# Patient Record
Sex: MALE | Race: OTHER | HISPANIC OR LATINO | URBAN - METROPOLITAN AREA
[De-identification: names, ages, dates, MRNs, and addresses within clinical notes are randomized per-mention and may not be internally consistent; named-entity substitution may affect disease eponyms.]

---

## 2017-11-20 ENCOUNTER — INPATIENT (INPATIENT)
Facility: HOSPITAL | Age: 42
LOS: 1 days | Discharge: HOME | DRG: 305 | End: 2017-11-22
Attending: INTERNAL MEDICINE | Admitting: INTERNAL MEDICINE
Payer: COMMERCIAL

## 2017-11-20 VITALS
RESPIRATION RATE: 22 BRPM | HEART RATE: 98 BPM | HEIGHT: 66 IN | WEIGHT: 315 LBS | DIASTOLIC BLOOD PRESSURE: 125 MMHG | OXYGEN SATURATION: 90 % | TEMPERATURE: 99 F | SYSTOLIC BLOOD PRESSURE: 245 MMHG

## 2017-11-20 DIAGNOSIS — D72.829 ELEVATED WHITE BLOOD CELL COUNT, UNSPECIFIED: ICD-10-CM

## 2017-11-20 DIAGNOSIS — I50.9 HEART FAILURE, UNSPECIFIED: ICD-10-CM

## 2017-11-20 DIAGNOSIS — Z29.9 ENCOUNTER FOR PROPHYLACTIC MEASURES, UNSPECIFIED: ICD-10-CM

## 2017-11-20 DIAGNOSIS — I16.0 HYPERTENSIVE URGENCY: ICD-10-CM

## 2017-11-20 DIAGNOSIS — E11.9 TYPE 2 DIABETES MELLITUS WITHOUT COMPLICATIONS: ICD-10-CM

## 2017-11-20 PROBLEM — Z00.00 ENCOUNTER FOR PREVENTIVE HEALTH EXAMINATION: Status: ACTIVE | Noted: 2017-11-20

## 2017-11-20 LAB
ALBUMIN SERPL ELPH-MCNC: 3.1 G/DL — LOW (ref 3.5–5)
ALP SERPL-CCNC: 100 U/L — SIGNIFICANT CHANGE UP (ref 40–120)
ALT FLD-CCNC: 44 U/L DA — SIGNIFICANT CHANGE UP (ref 10–60)
ANION GAP SERPL CALC-SCNC: 8 MMOL/L — SIGNIFICANT CHANGE UP (ref 5–17)
APTT BLD: 30.4 SEC — SIGNIFICANT CHANGE UP (ref 27.5–37.4)
AST SERPL-CCNC: 20 U/L — SIGNIFICANT CHANGE UP (ref 10–40)
BASOPHILS # BLD AUTO: 0.1 K/UL — SIGNIFICANT CHANGE UP (ref 0–0.2)
BASOPHILS NFR BLD AUTO: 0.9 % — SIGNIFICANT CHANGE UP (ref 0–2)
BILIRUB SERPL-MCNC: 1.3 MG/DL — HIGH (ref 0.2–1.2)
BUN SERPL-MCNC: 14 MG/DL — SIGNIFICANT CHANGE UP (ref 7–18)
CALCIUM SERPL-MCNC: 9.1 MG/DL — SIGNIFICANT CHANGE UP (ref 8.4–10.5)
CHLORIDE SERPL-SCNC: 100 MMOL/L — SIGNIFICANT CHANGE UP (ref 96–108)
CK MB BLD-MCNC: 1.4 % — SIGNIFICANT CHANGE UP (ref 0–3.5)
CK MB CFR SERPL CALC: 2.8 NG/ML — SIGNIFICANT CHANGE UP (ref 0–3.6)
CK SERPL-CCNC: 195 U/L — SIGNIFICANT CHANGE UP (ref 35–232)
CO2 SERPL-SCNC: 32 MMOL/L — HIGH (ref 22–31)
CREAT SERPL-MCNC: 1.11 MG/DL — SIGNIFICANT CHANGE UP (ref 0.5–1.3)
EOSINOPHIL # BLD AUTO: 0.2 K/UL — SIGNIFICANT CHANGE UP (ref 0–0.5)
EOSINOPHIL NFR BLD AUTO: 1.5 % — SIGNIFICANT CHANGE UP (ref 0–6)
GLUCOSE SERPL-MCNC: 118 MG/DL — HIGH (ref 70–99)
HCT VFR BLD CALC: 55.3 % — HIGH (ref 39–50)
HGB BLD-MCNC: 16.7 G/DL — SIGNIFICANT CHANGE UP (ref 13–17)
INR BLD: 1.19 RATIO — HIGH (ref 0.88–1.16)
LYMPHOCYTES # BLD AUTO: 1.3 K/UL — SIGNIFICANT CHANGE UP (ref 1–3.3)
LYMPHOCYTES # BLD AUTO: 9.8 % — LOW (ref 13–44)
MCHC RBC-ENTMCNC: 26.2 PG — LOW (ref 27–34)
MCHC RBC-ENTMCNC: 30.2 GM/DL — LOW (ref 32–36)
MCV RBC AUTO: 87 FL — SIGNIFICANT CHANGE UP (ref 80–100)
MONOCYTES # BLD AUTO: 0.6 K/UL — SIGNIFICANT CHANGE UP (ref 0–0.9)
MONOCYTES NFR BLD AUTO: 4.4 % — SIGNIFICANT CHANGE UP (ref 2–14)
NEUTROPHILS # BLD AUTO: 11 K/UL — HIGH (ref 1.8–7.4)
NEUTROPHILS NFR BLD AUTO: 83.4 % — HIGH (ref 43–77)
NT-PROBNP SERPL-SCNC: 1137 PG/ML — HIGH (ref 0–125)
PLATELET # BLD AUTO: 353 K/UL — SIGNIFICANT CHANGE UP (ref 150–400)
POTASSIUM SERPL-MCNC: 3.7 MMOL/L — SIGNIFICANT CHANGE UP (ref 3.5–5.3)
POTASSIUM SERPL-SCNC: 3.7 MMOL/L — SIGNIFICANT CHANGE UP (ref 3.5–5.3)
PROT SERPL-MCNC: 7.3 G/DL — SIGNIFICANT CHANGE UP (ref 6–8.3)
PROTHROM AB SERPL-ACNC: 13 SEC — HIGH (ref 9.8–12.7)
RBC # BLD: 6.35 M/UL — HIGH (ref 4.2–5.8)
RBC # FLD: 17.5 % — HIGH (ref 10.3–14.5)
SODIUM SERPL-SCNC: 140 MMOL/L — SIGNIFICANT CHANGE UP (ref 135–145)
TROPONIN I SERPL-MCNC: 0.12 NG/ML — HIGH (ref 0–0.04)
TROPONIN I SERPL-MCNC: 0.14 NG/ML — HIGH (ref 0–0.04)
WBC # BLD: 13.2 K/UL — HIGH (ref 3.8–10.5)
WBC # FLD AUTO: 13.2 K/UL — HIGH (ref 3.8–10.5)

## 2017-11-20 PROCEDURE — 93010 ELECTROCARDIOGRAM REPORT: CPT

## 2017-11-20 PROCEDURE — 71020: CPT | Mod: 26

## 2017-11-20 PROCEDURE — 93971 EXTREMITY STUDY: CPT | Mod: 26,RT

## 2017-11-20 PROCEDURE — 99285 EMERGENCY DEPT VISIT HI MDM: CPT

## 2017-11-20 PROCEDURE — 93306 TTE W/DOPPLER COMPLETE: CPT | Mod: 26

## 2017-11-20 RX ORDER — FUROSEMIDE 40 MG
80 TABLET ORAL EVERY 12 HOURS
Qty: 0 | Refills: 0 | Status: DISCONTINUED | OUTPATIENT
Start: 2017-11-20 | End: 2017-11-22

## 2017-11-20 RX ORDER — FUROSEMIDE 40 MG
40 TABLET ORAL ONCE
Qty: 0 | Refills: 0 | Status: COMPLETED | OUTPATIENT
Start: 2017-11-20 | End: 2017-11-20

## 2017-11-20 RX ORDER — INFLUENZA VIRUS VACCINE 15; 15; 15; 15 UG/.5ML; UG/.5ML; UG/.5ML; UG/.5ML
0.5 SUSPENSION INTRAMUSCULAR ONCE
Qty: 0 | Refills: 0 | Status: COMPLETED | OUTPATIENT
Start: 2017-11-20 | End: 2017-11-22

## 2017-11-20 RX ORDER — SODIUM CHLORIDE 9 MG/ML
3 INJECTION INTRAMUSCULAR; INTRAVENOUS; SUBCUTANEOUS ONCE
Qty: 0 | Refills: 0 | Status: COMPLETED | OUTPATIENT
Start: 2017-11-20 | End: 2017-11-20

## 2017-11-20 RX ORDER — ASPIRIN/CALCIUM CARB/MAGNESIUM 324 MG
81 TABLET ORAL DAILY
Qty: 0 | Refills: 0 | Status: DISCONTINUED | OUTPATIENT
Start: 2017-11-21 | End: 2017-11-22

## 2017-11-20 RX ORDER — LABETALOL HCL 100 MG
10 TABLET ORAL ONCE
Qty: 0 | Refills: 0 | Status: COMPLETED | OUTPATIENT
Start: 2017-11-20 | End: 2017-11-20

## 2017-11-20 RX ORDER — ASPIRIN/CALCIUM CARB/MAGNESIUM 324 MG
325 TABLET ORAL ONCE
Qty: 0 | Refills: 0 | Status: COMPLETED | OUTPATIENT
Start: 2017-11-20 | End: 2017-11-20

## 2017-11-20 RX ADMIN — Medication 10 MILLIGRAM(S): at 20:11

## 2017-11-20 RX ADMIN — Medication 10 MILLIGRAM(S): at 13:39

## 2017-11-20 RX ADMIN — Medication 40 MILLIGRAM(S): at 13:35

## 2017-11-20 RX ADMIN — Medication 80 MILLIGRAM(S): at 23:30

## 2017-11-20 RX ADMIN — Medication 325 MILLIGRAM(S): at 15:02

## 2017-11-20 RX ADMIN — SODIUM CHLORIDE 3 MILLILITER(S): 9 INJECTION INTRAMUSCULAR; INTRAVENOUS; SUBCUTANEOUS at 13:33

## 2017-11-20 NOTE — ED PROVIDER NOTE - OBJECTIVE STATEMENT
43 y/o male with PMHx of DM presents to the ED c/o R leg swelling and SOB x4 days. Pt denies CP, nausea, vomiting, diaphoresis, back pain, or any other complaints. NKDA.

## 2017-11-20 NOTE — H&P ADULT - ASSESSMENT
43 y/o male with PMH DM presents to the ED c/o R leg swelling and SOB x 5 days. Pt states that it gets worst during exertion. Pt sleeps with 4 pillows and sometimes wakes up with SOB. As per pt he has no hx of HTN. Pt  describes that his lower extremity edema is getting worst and  that his RLE pain is getting worst. Pt has a BNP of 1137 with positive t1 0.142. Pt denies CP, nausea, vomiting, diaphoresis, back pain, or any other complaints

## 2017-11-20 NOTE — H&P ADULT - PROBLEM SELECTOR PLAN 2
Bp: 245/125   s/p labetalol   not decrease more 25% in the first hour  then maintain average BP of 150/100 for 6 hours  if needed pt can be started on procardia Bp: 245/125   s/p labetalol   not decrease more 25% in the first hour  increase troponin most likely to demand ischemia and HTN  then maintain average BP of 150/100 for 6 hours  will start ace inhibitor since pt is diabetic

## 2017-11-20 NOTE — H&P ADULT - PROBLEM SELECTOR PLAN 1
41 yo M with hx DM presents with SOB and bilateral LE edema   family hx of cardiac disease   pt complaint of orthopnea and uses 3 pillows at home   pt also at times has nocturnal paroxysmal dyspnea   BNP: 1132  EKG NSR   LE duplex negatvie for DVT, PE less likely   could be 2/2 to new onset HF   will get echo   Cardio: 41 yo M with hx DM presents with SOB and bilateral LE edema   family hx of cardiac disease   pt complaint of orthopnea and uses 3 pillows at home   pt also at times has nocturnal paroxysmal dyspnea   CXR shows cardiomegaly   BNP: 1132  EKG NSR   LE duplex negatvie for DVT, PE less likely   could be 2/2 to new onset HF   will get echo   Cardio:

## 2017-11-20 NOTE — ED PROVIDER NOTE - FAMILY HISTORY
Mother  Still living? Unknown  Family history of colon cancer, Age at diagnosis: Age Unknown  Family history of diabetes mellitus, Age at diagnosis: Age Unknown  Family history of hypertension in mother, Age at diagnosis: Age Unknown     Sibling  Still living? Unknown  Family history of early CAD, Age at diagnosis: Age Unknown

## 2017-11-20 NOTE — H&P ADULT - HISTORY OF PRESENT ILLNESS
43 y/o male with PMH DM presents to the ED c/o R leg swelling and SOB x 5 days. Pt states that it gets worst during exertion. Pt sleeps with 4 pillows and sometimes wakes up with SOB. As per pt he has no hx of HTN. Pt  describes that his lower extremity edema is getting worst and  that his RLE pain is getting worst. Pt denies CP, nausea, vomiting, diaphoresis, back pain, or any other complaints

## 2017-11-21 DIAGNOSIS — I16.1 HYPERTENSIVE EMERGENCY: ICD-10-CM

## 2017-11-21 LAB
CHOLEST SERPL-MCNC: 135 MG/DL — SIGNIFICANT CHANGE UP (ref 10–199)
FOLATE SERPL-MCNC: 9.2 NG/ML — SIGNIFICANT CHANGE UP (ref 4.8–24.2)
GLUCOSE BLDC GLUCOMTR-MCNC: 103 MG/DL — HIGH (ref 70–99)
GLUCOSE BLDC GLUCOMTR-MCNC: 112 MG/DL — HIGH (ref 70–99)
GLUCOSE BLDC GLUCOMTR-MCNC: 127 MG/DL — HIGH (ref 70–99)
GLUCOSE BLDC GLUCOMTR-MCNC: 151 MG/DL — HIGH (ref 70–99)
HBA1C BLD-MCNC: 7.2 % — HIGH (ref 4–5.6)
HDLC SERPL-MCNC: 40 MG/DL — SIGNIFICANT CHANGE UP (ref 40–125)
LIPID PNL WITH DIRECT LDL SERPL: 73 MG/DL — SIGNIFICANT CHANGE UP
MAGNESIUM SERPL-MCNC: 1.9 MG/DL — SIGNIFICANT CHANGE UP (ref 1.6–2.6)
PCP SPEC-MCNC: SIGNIFICANT CHANGE UP
PHOSPHATE SERPL-MCNC: 3.9 MG/DL — SIGNIFICANT CHANGE UP (ref 2.5–4.5)
TOTAL CHOLESTEROL/HDL RATIO MEASUREMENT: 3.4 RATIO — SIGNIFICANT CHANGE UP (ref 3.4–9.6)
TRIGL SERPL-MCNC: 112 MG/DL — SIGNIFICANT CHANGE UP (ref 10–149)
TSH SERPL-MCNC: 3.06 UU/ML — SIGNIFICANT CHANGE UP (ref 0.34–4.82)
VIT B12 SERPL-MCNC: 376 PG/ML — SIGNIFICANT CHANGE UP (ref 243–894)

## 2017-11-21 RX ORDER — ATORVASTATIN CALCIUM 80 MG/1
40 TABLET, FILM COATED ORAL AT BEDTIME
Qty: 0 | Refills: 0 | Status: DISCONTINUED | OUTPATIENT
Start: 2017-11-21 | End: 2017-11-22

## 2017-11-21 RX ORDER — CARVEDILOL PHOSPHATE 80 MG/1
6.25 CAPSULE, EXTENDED RELEASE ORAL EVERY 12 HOURS
Qty: 0 | Refills: 0 | Status: DISCONTINUED | OUTPATIENT
Start: 2017-11-21 | End: 2017-11-22

## 2017-11-21 RX ADMIN — Medication 10 MILLIGRAM(S): at 06:09

## 2017-11-21 RX ADMIN — Medication 80 MILLIGRAM(S): at 17:43

## 2017-11-21 RX ADMIN — Medication 80 MILLIGRAM(S): at 06:09

## 2017-11-21 RX ADMIN — CARVEDILOL PHOSPHATE 6.25 MILLIGRAM(S): 80 CAPSULE, EXTENDED RELEASE ORAL at 17:43

## 2017-11-21 RX ADMIN — ATORVASTATIN CALCIUM 40 MILLIGRAM(S): 80 TABLET, FILM COATED ORAL at 21:53

## 2017-11-21 RX ADMIN — Medication 81 MILLIGRAM(S): at 12:18

## 2017-11-21 NOTE — CONSULT NOTE ADULT - SUBJECTIVE AND OBJECTIVE BOX
Patient is a 42y old  Male who presents with a chief complaint of SOB and RLE edema (20 Nov 2017 15:33)      INTERVAL HPI/OVERNIGHT EVENTS:  T(F): 98.5 (11-21-17 @ 05:47), Max: 99.2 (11-20-17 @ 12:15)  HR: 85 (11-21-17 @ 05:47) (64 - 98)  BP: 156/91 (11-21-17 @ 05:47) (152/103 - 245/125)  RR: 18 (11-21-17 @ 05:47) (16 - 22)  SpO2: 98% (11-21-17 @ 05:47) (90% - 98%)  Wt(kg): --  I&O's Summary      PAST MEDICAL & SURGICAL HISTORY:  DM (diabetes mellitus): type 2  No significant past surgical history      SOCIAL HISTORY  Alcohol:  Tobacco:  Illicit substance use:      FAMILY HISTORY:      LABS:                        16.7   13.2  )-----------( 353      ( 20 Nov 2017 13:39 )             55.3     11-20    140  |  100  |  14  ----------------------------<  118<H>  3.7   |  32<H>  |  1.11    Ca    9.1      20 Nov 2017 13:39  Phos  3.9     11-21  Mg     1.9     11-21    TPro  7.3  /  Alb  3.1<L>  /  TBili  1.3<H>  /  DBili  x   /  AST  20  /  ALT  44  /  AlkPhos  100  11-20    PT/INR - ( 20 Nov 2017 13:39 )   PT: 13.0 sec;   INR: 1.19 ratio         PTT - ( 20 Nov 2017 13:39 )  PTT:30.4 sec    CAPILLARY BLOOD GLUCOSE      POCT Blood Glucose.: 127 mg/dL (21 Nov 2017 08:03)            MEDICATIONS  (STANDING):  aspirin  chewable 81 milliGRAM(s) Oral daily  carvedilol 6.25 milliGRAM(s) Oral every 12 hours  enalapril 10 milliGRAM(s) Oral daily  furosemide   Injectable 80 milliGRAM(s) IV Push every 12 hours  influenza   Vaccine 0.5 milliLiter(s) IntraMuscular once    MEDICATIONS  (PRN):      REVIEW OF SYSTEMS:  CONSTITUTIONAL: No fever, weight loss, or fatigue  EYES: No eye pain, visual disturbances, or discharge  ENMT:  No difficulty hearing, tinnitus, vertigo; No sinus or throat pain  NECK: No pain or stiffness  RESPIRATORY:  shortness of breath, No cough, wheezing, chills or hemoptysis;   CARDIOVASCULAR: No chest pain, palpitations, dizziness,  leg swelling present  GASTROINTESTINAL: No abdominal or epigastric pain. No nausea, vomiting, or hematemesis; No diarrhea or constipation. No melena or hematochezia.  GENITOURINARY: No dysuria, frequency, hematuria, or incontinence  NEUROLOGICAL: No headaches, memory loss, loss of strength, numbness, or tremors  SKIN: No itching, burning, rashes, or lesions   LYMPH NODES: No enlarged glands  ENDOCRINE: No heat or cold intolerance; No hair loss  MUSCULOSKELETAL: No joint pain or swelling; No muscle, back, or extremity pain  PSYCHIATRIC: No depression, anxiety, mood swings, or difficulty sleeping  HEME/LYMPH: No easy bruising, or bleeding gums  ALLERGY AND IMMUNOLOGIC: No hives or eczema    RADIOLOGY & ADDITIONAL TESTS:    Imaging Personally Reviewed:  [ x] YES  [ ] NO    Consultant(s) Notes Reviewed:  [x ] YES  [ ] NO    PHYSICAL EXAM:  GENERAL: NAD, well-groomed, well-developed  HEAD:  Atraumatic, Normocephalic  EYES: EOMI, PERRLA, conjunctiva and sclera clear  ENMT: No tonsillar erythema, exudates, or enlargement; Moist mucous membranes, Good dentition, No lesions  NECK: Supple, No JVD, Normal thyroid  NERVOUS SYSTEM:  Alert & Oriented X3, Good concentration; Motor Strength 5/5 B/L upper and lower extremities; DTRs 2+ intact and symmetric  CHEST/LUNG: Clear to percussion bilaterally; No rales, rhonchi, wheezing, or rubs  HEART: Regular rate and rhythm; No murmurs, rubs, or gallops  ABDOMEN: Soft, Nontender, Nondistended; Bowel sounds present  EXTREMITIES:  2+ pedal edema, 2+ Peripheral Pulses, No clubbing, cyanosis.  LYMPH: No lymphadenopathy noted  SKIN: No rashes or lesions    Care Discussed with Consultants/Other Providers [x ] YES  [ ] NO

## 2017-11-21 NOTE — PROGRESS NOTE ADULT - PROBLEM SELECTOR PLAN 2
Bp: 245/125   s/p labetalol   increase troponin most likely to demand ischemia and HTN  bp 150/90s, start on coreg 6.25 BID, on IV lasix 40 BID for today, will switch to PO  Also on enalapril 10 mg daily.  Creatinine will likely get worse in am due to Drop in blood pressure.

## 2017-11-21 NOTE — PROGRESS NOTE ADULT - SUBJECTIVE AND OBJECTIVE BOX
PGY 1 Note discussed with supervising resident and primary attending    Patient is a 42y old  Male who presents with a chief complaint of SOB and RLE edema (20 Nov 2017 15:33)      INTERVAL HPI/OVERNIGHT EVENTS:  Patient was seen and examined at bed side. patient states that his shortness of breath is resolved. patient's swelling is still present. patient's lungs were clear to auscultation. patient wanted to leave today but explained that intravenous lasix is necessary for him and will switch to PO lasix tomorrow.     MEDICATIONS  (STANDING):  aspirin  chewable 81 milliGRAM(s) Oral daily  carvedilol 6.25 milliGRAM(s) Oral every 12 hours  enalapril 10 milliGRAM(s) Oral daily  furosemide   Injectable 80 milliGRAM(s) IV Push every 12 hours  influenza   Vaccine 0.5 milliLiter(s) IntraMuscular once    MEDICATIONS  (PRN):      __________________________________________________  REVIEW OF SYSTEMS:    CONSTITUTIONAL: No fever,   EYES: no acute visual disturbances  NECK: No pain or stiffness  RESPIRATORY: No cough; No shortness of breath at this point.   CARDIOVASCULAR: No chest pain, no palpitations  GASTROINTESTINAL: No pain. No nausea or vomiting; No diarrhea   NEUROLOGICAL: No headache or numbness, no tremors  MUSCULOSKELETAL: No joint pain, no muscle pain  GENITOURINARY: no dysuria, no frequency, no hesitancy  PSYCHIATRY: no depression , no anxiety  ALL OTHER  ROS negative        Vital Signs Last 24 Hrs  T(C): 36.9 (21 Nov 2017 05:47), Max: 37.2 (20 Nov 2017 16:03)  T(F): 98.5 (21 Nov 2017 05:47), Max: 98.9 (20 Nov 2017 16:03)  HR: 85 (21 Nov 2017 05:47) (64 - 96)  BP: 156/91 (21 Nov 2017 05:47) (152/103 - 194/114)  BP(mean): --  RR: 18 (21 Nov 2017 05:47) (16 - 20)  SpO2: 98% (21 Nov 2017 05:47) (90% - 98%)    ________________________________________________  PHYSICAL EXAM:  GENERAL: NAD  HEENT: Normocephalic;  conjunctivae and sclerae clear; moist mucous membranes;   NECK : supple  CHEST/LUNG: Clear to auscultation bilaterally with good air entry   HEART: S1 S2  regular; no murmurs, gallops or rubs  ABDOMEN: Soft, Nontender, Nondistended; Bowel sounds present  EXTREMITIES: no cyanosis; 2+ pitting edema; no calf tenderness  SKIN: warm and dry; no rash  NERVOUS SYSTEM:  Awake and alert; Oriented  to place, person and time ; no new deficits    _________________________________________________  LABS:                        16.7   13.2  )-----------( 353      ( 20 Nov 2017 13:39 )             55.3     11-20    140  |  100  |  14  ----------------------------<  118<H>  3.7   |  32<H>  |  1.11    Ca    9.1      20 Nov 2017 13:39  Phos  3.9     11-21  Mg     1.9     11-21    TPro  7.3  /  Alb  3.1<L>  /  TBili  1.3<H>  /  DBili  x   /  AST  20  /  ALT  44  /  AlkPhos  100  11-20    PT/INR - ( 20 Nov 2017 13:39 )   PT: 13.0 sec;   INR: 1.19 ratio         PTT - ( 20 Nov 2017 13:39 )  PTT:30.4 sec    CAPILLARY BLOOD GLUCOSE      POCT Blood Glucose.: 112 mg/dL (21 Nov 2017 12:02)  POCT Blood Glucose.: 127 mg/dL (21 Nov 2017 08:03)        RADIOLOGY & ADDITIONAL TESTS:      < from: Transthoracic Echocardiogram (11.20.17 @ 16:10) >  CONCLUSIONS:  1. Normal mitral valve. Trace mitral regurgitation.  2. Probably trileaflet aortic valve is not well seen. No  aortic stenosis. No aortic valve regurgitation seen.  3. Normal aortic root for patient's BSA (Index 1.1).  4. Mildly dilated left atrium.  LA volume index = 36 cc/m2.  5. Severe concentric left ventricular hypertrophy.  6. Endocardium not well visualized; grossly normal left  ventricular function.  7. Grade II diastolic dysfunction.  8. Right ventricular enlargement with normal RV function  (TAPSE 2.2 cm).  9. RA Pressure is 15 mm Hg.  10. RV systolic pressure is moderately increased at  51 mm  Hg.  11. Normal tricuspid valve. Trace tricuspid regurgitation.  12. Pulmonic valve not well seen. Trace pulmonic  insufficiency is noted.  13. No pericardial effusion.          Imaging Personally Reviewed:  YES    Consultant(s) Notes Reviewed:   YES  Care Discussed with Consultants :     Plan of care was discussed with patient and /or primary care giver; all questions and concerns were addressed and care was aligned with patient's wishes.

## 2017-11-21 NOTE — PROGRESS NOTE ADULT - PROBLEM SELECTOR PLAN 1
43 yo M with hx DM presents with SOB and bilateral LE edema   family hx of cardiac disease   pt complaint of orthopnea and uses 3 pillows at home   morbidly obese, will need out patient sleep study  echo as above.   pt also at times has nocturnal paroxysmal dyspnea   CXR shows cardiomegaly   BNP: 1132  EKG NSR   LE duplex negatvie for DVT, PE less likely   2/2 to new onset congestive heart failure   Cardio: Dr Martell consult noted

## 2017-11-21 NOTE — CONSULT NOTE ADULT - SUBJECTIVE AND OBJECTIVE BOX
42 year old obese male with history of DM on metformin presented for shortness on breath on exacerbation and need 4 pillows to sleep at night and right leg swelling. In ED patient BP is found to be 245/125. Patient is never known to have HTN, and does not follow up regularly with PCP. Denied headache, palpitation and chest pain. Patient admit snoring at night and feeling tired in daytime for the last 2 weeks.       PAST MEDICAL & SURGICAL HISTORY:  DM (diabetes mellitus): type 2  No significant past surgical history      No Known Allergies      Meds:  aspirin  chewable 81 milliGRAM(s) Oral daily  atorvastatin 40 milliGRAM(s) Oral at bedtime  carvedilol 6.25 milliGRAM(s) Oral every 12 hours  enalapril 10 milliGRAM(s) Oral daily  furosemide   Injectable 80 milliGRAM(s) IV Push every 12 hours  influenza   Vaccine 0.5 milliLiter(s) IntraMuscular once      SOCIAL HISTORY:  Smoker:  YES / NO        PACK YEARS:                         WHEN QUIT?  ETOH use:  YES / NO               FREQUENCY / QUANTITY:  Ilicit Drug use:  YES / NO  Occupation:  Assisted device use (Cane / Walker):  Live with:    FAMILY HISTORY:  Family history of hypertension in mother  Family history of diabetes mellitus  Family history of early CAD (Sibling)  Family history of colon cancer    REVIEW OF SYSTEMS:    CONSTITUTIONAL: No weakness, fevers or chills  EYES/ENT: No visual changes;  No vertigo or throat pain   NECK: No pain or stiffness  RESPIRATORY: No cough, wheezing, hemoptysis; SOB as mentioned above  CARDIOVASCULAR: No chest pain or palpitations  GASTROINTESTINAL: No abdominal or epigastric pain. No nausea, vomiting, or hematemesis; No diarrhea or constipation. No melena or hematochezia.  GENITOURINARY: No dysuria, frequency or hematuria  NEUROLOGICAL: No numbness or weakness  SKIN: No itching, rashes  No other complaint except mentioned as above.       VITALS:  Vital Signs Last 24 Hrs  T(C): 36.9 (21 Nov 2017 05:47), Max: 37.2 (20 Nov 2017 16:03)  T(F): 98.5 (21 Nov 2017 05:47), Max: 98.9 (20 Nov 2017 16:03)  HR: 85 (21 Nov 2017 05:47) (64 - 96)  BP: 156/91 (21 Nov 2017 05:47) (152/103 - 194/114)  BP(mean): --  RR: 18 (21 Nov 2017 05:47) (16 - 20)  SpO2: 98% (21 Nov 2017 05:47) (90% - 98%)    PHYSICAL EXAM:    GENERAL: NAD, well-developed    HEAD:  Atraumatic, Normocephalic    EYES: EOMI, PERRLA, conjunctiva and sclera clear    NECK: Supple, No JVD    CHEST/LUNG: Clear to auscultation bilaterally; No wheeze    HEART: Regular rate and rhythm; No murmurs, rubs, or gallops    ABDOMEN: Soft, Nontender, Nondistended; Bowel sounds present    EXTREMITIES:  2+ Peripheral Pulses, No clubbing, cyanosis, 3+edema on right lower leg and 2+ edema on left lower leg.   and skin discoloration on bilateral lower extremities (likely from uncontrolled DM)    PSYCH: AAOx3    NEUROLOGY: non-focal    SKIN: No rashes or lesions    No other pertinent positive finding except mentioned as above.         LABS/DIAGNOSTIC TESTS:                          16.7   13.2  )-----------( 353      ( 20 Nov 2017 13:39 )             55.3     WBC Count: 13.2 K/uL (11-20 @ 13:39)      11-20    140  |  100  |  14  ----------------------------<  118<H>  3.7   |  32<H>  |  1.11    Ca    9.1      20 Nov 2017 13:39  Phos  3.9     11-21  Mg     1.9     11-21    TPro  7.3  /  Alb  3.1<L>  /  TBili  1.3<H>  /  DBili  x   /  AST  20  /  ALT  44  /  AlkPhos  100  11-20          LIVER FUNCTIONS - ( 20 Nov 2017 13:39 )  Alb: 3.1 g/dL / Pro: 7.3 g/dL / ALK PHOS: 100 U/L / ALT: 44 U/L DA / AST: 20 U/L / GGT: x             PT/INR - ( 20 Nov 2017 13:39 )   PT: 13.0 sec;   INR: 1.19 ratio         PTT - ( 20 Nov 2017 13:39 )  PTT:30.4 sec ANGUS SAENZ. NEPHROLOGY- CONSULT    42 year old obese male with history of DM on metformin presented for shortness on breath on exacerbation and need 4 pillows to sleep at night and right leg swelling. In ED patient BP is found to be 245/125. Patient is never known to have HTN, and does not follow up regularly with PCP. Denied headache, palpitation, vision changes or chest pain. Patient admit snoring at night and feeling tired in daytime for the last 2 weeks.   Pt denies any episodes of HA with diaphoresis   Pt denies any drug use.       PAST MEDICAL & SURGICAL HISTORY:  DM (diabetes mellitus): type 2  No significant past surgical history      No Known Allergies      Meds:  aspirin  chewable 81 milliGRAM(s) Oral daily  atorvastatin 40 milliGRAM(s) Oral at bedtime  carvedilol 6.25 milliGRAM(s) Oral every 12 hours  enalapril 10 milliGRAM(s) Oral daily  furosemide   Injectable 80 milliGRAM(s) IV Push every 12 hours  influenza   Vaccine 0.5 milliLiter(s) IntraMuscular once      SOCIAL HISTORY:  Smoker:  YES / NO        PACK YEARS:                         WHEN QUIT?  ETOH use:  YES / NO               FREQUENCY / QUANTITY:  Ilicit Drug use:  YES / NO  Occupation:   Assisted device use (Cane / Walker):  Live with:    FAMILY HISTORY:  Family history of hypertension in mother  Family history of diabetes mellitus  Family history of early CAD (Sibling)  Family history of colon cancer    REVIEW OF SYSTEMS:    CONSTITUTIONAL: No weakness, fevers or chills  EYES/ENT: No visual changes;  No vertigo or throat pain   NECK: No pain or stiffness  RESPIRATORY: No cough, wheezing, hemoptysis; SOB as mentioned above  CARDIOVASCULAR: No chest pain or palpitations  GASTROINTESTINAL: No abdominal or epigastric pain. No nausea, vomiting, or hematemesis; No diarrhea or constipation. No melena or hematochezia.  GENITOURINARY: No dysuria, frequency or hematuria  NEUROLOGICAL: No numbness or weakness  SKIN: No itching, rashes  EXT: +b/l LE Edema  No other complaint except mentioned as above.       VITALS:  Vital Signs Last 24 Hrs  T(C): 36.9 (21 Nov 2017 05:47), Max: 37.2 (20 Nov 2017 16:03)  T(F): 98.5 (21 Nov 2017 05:47), Max: 98.9 (20 Nov 2017 16:03)  HR: 85 (21 Nov 2017 05:47) (64 - 96)  BP: 156/91 (21 Nov 2017 05:47) (152/103 - 194/114)  BP(mean): --  RR: 18 (21 Nov 2017 05:47) (16 - 20)  SpO2: 98% (21 Nov 2017 05:47) (90% - 98%)    PHYSICAL EXAM:    GENERAL: NAD, well-developed    HEAD:  Atraumatic, Normocephalic    EYES: EOMI, PERRLA, conjunctiva and sclera clear    NECK: Supple, No JVD    CHEST/LUNG: Clear to auscultation bilaterally; No wheeze    HEART: Regular rate and rhythm; No murmurs, rubs, or gallops    ABDOMEN: Soft, Nontender, Nondistended; Bowel sounds present    EXTREMITIES:  2+ Peripheral Pulses, No clubbing, cyanosis, 3+edema on right lower leg and 2+ edema on left lower leg.   and skin discoloration on bilateral lower extremities (likely from uncontrolled DM)    PSYCH: AAOx3    NEUROLOGY: non-focal    SKIN: No rashes or lesions    No other pertinent positive finding except mentioned as above.         LABS/DIAGNOSTIC TESTS:                          16.7   13.2  )-----------( 353      ( 20 Nov 2017 13:39 )             55.3     WBC Count: 13.2 K/uL (11-20 @ 13:39)      11-20    140  |  100  |  14  ----------------------------<  118<H>  3.7   |  32<H>  |  1.11    Ca    9.1      20 Nov 2017 13:39  Phos  3.9     11-21  Mg     1.9     11-21    TPro  7.3  /  Alb  3.1<L>  /  TBili  1.3<H>  /  DBili  x   /  AST  20  /  ALT  44  /  AlkPhos  100  11-20          LIVER FUNCTIONS - ( 20 Nov 2017 13:39 )  Alb: 3.1 g/dL / Pro: 7.3 g/dL / ALK PHOS: 100 U/L / ALT: 44 U/L DA / AST: 20 U/L / GGT: x             PT/INR - ( 20 Nov 2017 13:39 )   PT: 13.0 sec;   INR: 1.19 ratio         PTT - ( 20 Nov 2017 13:39 )  PTT:30.4 sec

## 2017-11-21 NOTE — CONSULT NOTE ADULT - ASSESSMENT
41 y/o male with PMH DM presents to the ED c/o R leg swelling and SOB x 5 days. Admitted for ?CHF and HTN urgency. Endocrinology was consulted for Diabetes.

## 2017-11-21 NOTE — CONSULT NOTE ADULT - PROBLEM SELECTOR RECOMMENDATION 3
-sugar well controlled inpatient  -f/u HbA1C as suspected uncontrolled blood sugar with evident of diabetes skin changes

## 2017-11-21 NOTE — CONSULT NOTE ADULT - PROBLEM SELECTOR RECOMMENDATION 9
home med: metformin 500mg daily  HbA1C 7.2  home FS has been as low as 70 and high as 110  diabetic diet  monitor FS ac hs home med: metformin 500mg daily  HbA1C 7.2, at near goal  home FS has been as low as 70 and high as 110  diabetic diet  monitor FS ac hs  recommend to increase metformin to 500mg BID and to consider starting victoza as outpatient home med: metformin 500mg daily  HbA1C 7.2, at near goal  home FS has been as low as 70 and high as 110  diabetic diet  monitor FS ac hs  recommend to increase metformin to 500mg BID and to consider starting victoza as outpatient  d/w hs

## 2017-11-21 NOTE — PROGRESS NOTE ADULT - PROBLEM SELECTOR PLAN 4
pt on Metformin 100 BID  f/u A1c 7.0  Endo consult Dr Estes appreciated  started on atorvastatin 40 mg daily

## 2017-11-21 NOTE — CONSULT NOTE ADULT - SUBJECTIVE AND OBJECTIVE BOX
Patient is a 42y old  Male who presents with a chief complaint of SOB and RLE edema (20 Nov 2017 15:33)    HPI:  41 y/o male with PMH DM presents to the ED c/o R leg swelling and SOB x 5 days. Admitted for ?CHF and HTN urgency. Endocrinology was consulted for Diabetes. Patient was seen and examined, reports that he has 1 year Dx of DM, checks FS at home once a week, home FS has been as low as 70 and high as 110. Takes metformin 500mg daily and no insulin. No complaints at this time.    PAST MEDICAL & SURGICAL HISTORY:  DM (diabetes mellitus): type 2  No significant past surgical history    MEDICATIONS  (STANDING):  aspirin  chewable 81 milliGRAM(s) Oral daily  atorvastatin 40 milliGRAM(s) Oral at bedtime  carvedilol 6.25 milliGRAM(s) Oral every 12 hours  enalapril 10 milliGRAM(s) Oral daily  furosemide   Injectable 80 milliGRAM(s) IV Push every 12 hours  influenza   Vaccine 0.5 milliLiter(s) IntraMuscular once    FAMILY HISTORY:  Family history of hypertension in mother  Family history of diabetes mellitus  Family history of early CAD (Sibling)  Family history of colon cancer    REVIEW OF SYSTEMS:  CONSTITUTIONAL: No fever, weight loss, or fatigue  EYES: No eye pain, visual disturbances, or discharge  ENT:  No difficulty hearing, tinnitus, vertigo; No sinus or throat pain  NECK: No pain or stiffness  RESPIRATORY: No cough, wheezing, chills or hemoptysis; No Shortness of Breath  CARDIOVASCULAR: No chest pain, palpitations, passing out, dizziness, or leg swelling  GASTROINTESTINAL: No abdominal or epigastric pain. No nausea, vomiting, or hematemesis; No diarrhea or constipation. No melena or hematochezia.  GENITOURINARY: No dysuria, frequency, hematuria, or incontinence  NEUROLOGICAL: No headaches, memory loss, loss of strength, numbness, or tremors  SKIN: No itching, burning, rashes, or lesions   LYMPH Nodes: No enlarged glands  ENDOCRINE: No heat or cold intolerance; No hair loss  MUSCULOSKELETAL: b/l LE edema  PSYCHIATRIC: No depression, anxiety, mood swings, or difficulty sleeping  HEME/LYMPH: No easy bruising, or bleeding gums  ALLERGY AND IMMUNOLOGIC: No hives or eczema	    Vital Signs Last 24 Hrs  T(C): 36.9 (21 Nov 2017 05:47), Max: 37.2 (20 Nov 2017 16:03)  T(F): 98.5 (21 Nov 2017 05:47), Max: 98.9 (20 Nov 2017 16:03)  HR: 85 (21 Nov 2017 05:47) (64 - 96)  BP: 156/91 (21 Nov 2017 05:47) (152/103 - 194/114)  BP(mean): --  RR: 18 (21 Nov 2017 05:47) (16 - 20)  SpO2: 98% (21 Nov 2017 05:47) (90% - 98%)    Constitutional:    NC/AT    Neck:  No JVD, bruits or thyromegaly    Respiratory:  Clear without rales or rhonchi    Cardiovascular:  RR without murmur, rub or gallop.    Gastrointestinal: Soft without hepatosplenomegaly.    Extremities: without cyanosis, clubbing; b/l LE edema    Neurological:  Oriented   x   3   . No gross sensory or motor defects.        LABS:                        16.7   13.2  )-----------( 353      ( 20 Nov 2017 13:39 )             55.3     11-20    140  |  100  |  14  ----------------------------<  118<H>  3.7   |  32<H>  |  1.11    Ca    9.1      20 Nov 2017 13:39  Phos  3.9     11-21  Mg     1.9     11-21    TPro  7.3  /  Alb  3.1<L>  /  TBili  1.3<H>  /  DBili  x   /  AST  20  /  ALT  44  /  AlkPhos  100  11-20    CARDIAC MARKERS ( 20 Nov 2017 20:38 )  0.119 ng/mL / x     / 195 U/L / x     / 2.8 ng/mL  CARDIAC MARKERS ( 20 Nov 2017 13:39 )  0.142 ng/mL / x     / x     / x     / x        PT/INR - ( 20 Nov 2017 13:39 )   PT: 13.0 sec;   INR: 1.19 ratio         PTT - ( 20 Nov 2017 13:39 )  PTT:30.4 sec    CAPILLARY BLOOD GLUCOSE    POCT Blood Glucose.: 112 mg/dL (21 Nov 2017 12:02)  POCT Blood Glucose.: 127 mg/dL (21 Nov 2017 08:03)

## 2017-11-21 NOTE — CONSULT NOTE ADULT - PROBLEM SELECTOR RECOMMENDATION 9
-with /125 on admission with evident of heart failure (bilateral lower extremities edema with SOB)  -likely chronic with echo showing hypertrophy of LV.   -HTN likely from MARA (obese, snore at night, tired daytime)  -BP goal for today 11/21 -180/100  -Continue with Lasix, Enalapril and Coreg.  -patient was counselled and long term BP control, regular outpatient follow up and life style changes in detail.   -Consider outpatient polysonography -with /125 on admission with evident of heart failure (bilateral lower extremities edema with SOB)  -likely chronic with echo showing hypertrophy of LV.   -HTN likely from MARA (obese, snore at night, tired daytime)  -BP goal for today 11/21 -180/100  Avoid rapid correction of BP. Please use large cuff when checking BP.   -Continue with Lasix, Enalapril and Coreg.  -patient was counselled and long term BP control, regular outpatient follow up and life style changes in detail.   -Consider outpatient polysonography  TSH wnl. No need for pheo w/u since pt asymptomatic. Can check scott/ renin ratio.   monitor BP -with /125 on admission with evident of heart failure (bilateral lower extremities edema with SOB)  -likely chronic with echo showing hypertrophy of LV.   -HTN likely from MARA (obese, snore at night, tired daytime)  -BP goal for today 11/21 -180/100  Avoid rapid correction of BP. Please use large cuff when checking BP.   -Continue with Lasix, Enalapril and Coreg.  -patient was counselled and long term BP control, regular outpatient follow up and life style changes in detail.   -Consider outpatient polysonography  TSH wnl. No need for pheo w/u since pt asymptomatic. Can check scott/ renin ratio. Drug tox neg.   monitor BP

## 2017-11-21 NOTE — CONSULT NOTE ADULT - ASSESSMENT
42 year old obese male with history of DM with snoring at night and daytime tiredness presented with SOB and bilateral lower extremities swelling with hypertensive emergency. 42 year old obese male with history of DM with snoring at night and daytime tiredness presented with SOB and bilateral lower extremities swelling with hypertensive urgency.

## 2017-11-21 NOTE — CONSULT NOTE ADULT - ATTENDING COMMENTS
Patient seen and examined. Agree with plan above.  Note edited as necessary.  [facility] NEPHROLOGY  Abel Del Cid M.D.  Denny Brown D.O.  Mariia Chahal M.D.  Piedad Morton, MSN, ANP-C  [phone]    [address] [id number]

## 2017-11-21 NOTE — CONSULT NOTE ADULT - ASSESSMENT
43 y/o male with PMH DM presents to the ED c/o R leg swelling and SOB x 5 days. Pt states that it gets worst during exertion. Pt sleeps with 4 pillows and sometimes wakes up with SOB. As per pt he has no hx of HTN. Pt  describes that his lower extremity edema is getting worst and  that his RLE pain is getting worst. Pt has a BNP of 1137 with positive t1 0.142. Pt denies CP, nausea, vomiting, diaphoresis, back pain, or any other complaints.     Problem/Plan - 1:  ·  Problem: CHF (congestive heart failure).  Plan: 41 yo M with hx DM presents with SOB and bilateral LE edema could be 2/2 to new onset HF.  family hx of cardiac disease.   pt complained of orthopnea and uses 3 pillows at home   pt also at times has nocturnal paroxysmal dyspnea   CXR shows cardiomegaly   BNP: 1132  EKG NSR   LE duplex negative for DVT, PE less likely   Echo: Grade 2 DD, normal EF  c/w lasix 80 mg bid, coreg, vasotec.       Problem/Plan - 2:  ·  Problem: Hypertensive urgency.  Plan: Bp: 245/125   s/p labetalol   increase troponin most likely to demand ischemia and HTN  c/w coreg, vasotec, lasix     Problem/Plan - 3:  ·  Problem: DM (diabetes mellitus).  Plan: pt on Metformin 100 BID  f/u A1c.      Problem/Plan - 4:  ·  Problem: Need for prophylactic measure.  Plan: VTE score= 2. no DVT prophylaxis at the time.

## 2017-11-22 ENCOUNTER — TRANSCRIPTION ENCOUNTER (OUTPATIENT)
Age: 42
End: 2017-11-22

## 2017-11-22 VITALS — OXYGEN SATURATION: 96 %

## 2017-11-22 DIAGNOSIS — E87.6 HYPOKALEMIA: ICD-10-CM

## 2017-11-22 LAB
ANION GAP SERPL CALC-SCNC: 7 MMOL/L — SIGNIFICANT CHANGE UP (ref 5–17)
BUN SERPL-MCNC: 20 MG/DL — HIGH (ref 7–18)
CALCIUM SERPL-MCNC: 9 MG/DL — SIGNIFICANT CHANGE UP (ref 8.4–10.5)
CHLORIDE SERPL-SCNC: 95 MMOL/L — LOW (ref 96–108)
CO2 SERPL-SCNC: 36 MMOL/L — HIGH (ref 22–31)
CREAT SERPL-MCNC: 1.25 MG/DL — SIGNIFICANT CHANGE UP (ref 0.5–1.3)
GLUCOSE BLDC GLUCOMTR-MCNC: 120 MG/DL — HIGH (ref 70–99)
GLUCOSE BLDC GLUCOMTR-MCNC: 134 MG/DL — HIGH (ref 70–99)
GLUCOSE SERPL-MCNC: 116 MG/DL — HIGH (ref 70–99)
HCT VFR BLD CALC: 55.8 % — HIGH (ref 39–50)
HGB BLD-MCNC: 16.7 G/DL — SIGNIFICANT CHANGE UP (ref 13–17)
MCHC RBC-ENTMCNC: 26.7 PG — LOW (ref 27–34)
MCHC RBC-ENTMCNC: 30 GM/DL — LOW (ref 32–36)
MCV RBC AUTO: 88.8 FL — SIGNIFICANT CHANGE UP (ref 80–100)
PLATELET # BLD AUTO: 339 K/UL — SIGNIFICANT CHANGE UP (ref 150–400)
POTASSIUM SERPL-MCNC: 3.1 MMOL/L — LOW (ref 3.5–5.3)
POTASSIUM SERPL-SCNC: 3.1 MMOL/L — LOW (ref 3.5–5.3)
RBC # BLD: 6.28 M/UL — HIGH (ref 4.2–5.8)
RBC # FLD: 17.2 % — HIGH (ref 10.3–14.5)
SODIUM SERPL-SCNC: 138 MMOL/L — SIGNIFICANT CHANGE UP (ref 135–145)
WBC # BLD: 14.3 K/UL — HIGH (ref 3.8–10.5)
WBC # FLD AUTO: 14.3 K/UL — HIGH (ref 3.8–10.5)

## 2017-11-22 PROCEDURE — 80053 COMPREHEN METABOLIC PANEL: CPT

## 2017-11-22 PROCEDURE — 83735 ASSAY OF MAGNESIUM: CPT

## 2017-11-22 PROCEDURE — 82962 GLUCOSE BLOOD TEST: CPT

## 2017-11-22 PROCEDURE — 96374 THER/PROPH/DIAG INJ IV PUSH: CPT

## 2017-11-22 PROCEDURE — 93306 TTE W/DOPPLER COMPLETE: CPT

## 2017-11-22 PROCEDURE — 82746 ASSAY OF FOLIC ACID SERUM: CPT

## 2017-11-22 PROCEDURE — 80307 DRUG TEST PRSMV CHEM ANLYZR: CPT

## 2017-11-22 PROCEDURE — 96375 TX/PRO/DX INJ NEW DRUG ADDON: CPT

## 2017-11-22 PROCEDURE — 93971 EXTREMITY STUDY: CPT

## 2017-11-22 PROCEDURE — 83036 HEMOGLOBIN GLYCOSYLATED A1C: CPT

## 2017-11-22 PROCEDURE — 84100 ASSAY OF PHOSPHORUS: CPT

## 2017-11-22 PROCEDURE — 85027 COMPLETE CBC AUTOMATED: CPT

## 2017-11-22 PROCEDURE — 85610 PROTHROMBIN TIME: CPT

## 2017-11-22 PROCEDURE — 82553 CREATINE MB FRACTION: CPT

## 2017-11-22 PROCEDURE — 80061 LIPID PANEL: CPT

## 2017-11-22 PROCEDURE — 90686 IIV4 VACC NO PRSV 0.5 ML IM: CPT

## 2017-11-22 PROCEDURE — 85730 THROMBOPLASTIN TIME PARTIAL: CPT

## 2017-11-22 PROCEDURE — 71046 X-RAY EXAM CHEST 2 VIEWS: CPT

## 2017-11-22 PROCEDURE — 84443 ASSAY THYROID STIM HORMONE: CPT

## 2017-11-22 PROCEDURE — 84484 ASSAY OF TROPONIN QUANT: CPT

## 2017-11-22 PROCEDURE — 82550 ASSAY OF CK (CPK): CPT

## 2017-11-22 PROCEDURE — 80048 BASIC METABOLIC PNL TOTAL CA: CPT

## 2017-11-22 PROCEDURE — 99285 EMERGENCY DEPT VISIT HI MDM: CPT | Mod: 25

## 2017-11-22 PROCEDURE — 82607 VITAMIN B-12: CPT

## 2017-11-22 PROCEDURE — 83880 ASSAY OF NATRIURETIC PEPTIDE: CPT

## 2017-11-22 PROCEDURE — 93005 ELECTROCARDIOGRAM TRACING: CPT

## 2017-11-22 RX ORDER — METFORMIN HYDROCHLORIDE 850 MG/1
1 TABLET ORAL
Qty: 60 | Refills: 0 | OUTPATIENT
Start: 2017-11-22 | End: 2017-12-22

## 2017-11-22 RX ORDER — CARVEDILOL PHOSPHATE 80 MG/1
1 CAPSULE, EXTENDED RELEASE ORAL
Qty: 60 | Refills: 0 | OUTPATIENT
Start: 2017-11-22 | End: 2017-12-22

## 2017-11-22 RX ORDER — ASPIRIN/CALCIUM CARB/MAGNESIUM 324 MG
1 TABLET ORAL
Qty: 0 | Refills: 0 | COMMUNITY
Start: 2017-11-22

## 2017-11-22 RX ORDER — ATORVASTATIN CALCIUM 80 MG/1
1 TABLET, FILM COATED ORAL
Qty: 30 | Refills: 0 | OUTPATIENT
Start: 2017-11-22 | End: 2017-12-22

## 2017-11-22 RX ORDER — FUROSEMIDE 40 MG
1 TABLET ORAL
Qty: 0 | Refills: 0 | COMMUNITY

## 2017-11-22 RX ORDER — POTASSIUM CHLORIDE 20 MEQ
40 PACKET (EA) ORAL EVERY 4 HOURS
Qty: 0 | Refills: 0 | Status: COMPLETED | OUTPATIENT
Start: 2017-11-22 | End: 2017-11-22

## 2017-11-22 RX ORDER — CARVEDILOL PHOSPHATE 80 MG/1
1 CAPSULE, EXTENDED RELEASE ORAL
Qty: 0 | Refills: 0 | COMMUNITY
Start: 2017-11-22

## 2017-11-22 RX ORDER — FUROSEMIDE 40 MG
1 TABLET ORAL
Qty: 60 | Refills: 0 | OUTPATIENT
Start: 2017-11-22 | End: 2017-12-22

## 2017-11-22 RX ORDER — ASPIRIN/CALCIUM CARB/MAGNESIUM 324 MG
1 TABLET ORAL
Qty: 30 | Refills: 0 | OUTPATIENT
Start: 2017-11-22 | End: 2017-12-22

## 2017-11-22 RX ORDER — ATORVASTATIN CALCIUM 80 MG/1
1 TABLET, FILM COATED ORAL
Qty: 0 | Refills: 0 | COMMUNITY
Start: 2017-11-22

## 2017-11-22 RX ORDER — ACETAZOLAMIDE 250 MG/1
500 TABLET ORAL ONCE
Qty: 0 | Refills: 0 | Status: COMPLETED | OUTPATIENT
Start: 2017-11-22 | End: 2017-11-22

## 2017-11-22 RX ADMIN — INFLUENZA VIRUS VACCINE 0.5 MILLILITER(S): 15; 15; 15; 15 SUSPENSION INTRAMUSCULAR at 12:11

## 2017-11-22 RX ADMIN — ACETAZOLAMIDE 110 MILLIGRAM(S): 250 TABLET ORAL at 12:11

## 2017-11-22 RX ADMIN — CARVEDILOL PHOSPHATE 6.25 MILLIGRAM(S): 80 CAPSULE, EXTENDED RELEASE ORAL at 05:47

## 2017-11-22 RX ADMIN — Medication 40 MILLIEQUIVALENT(S): at 09:39

## 2017-11-22 RX ADMIN — Medication 40 MILLIEQUIVALENT(S): at 13:32

## 2017-11-22 RX ADMIN — Medication 10 MILLIGRAM(S): at 05:47

## 2017-11-22 RX ADMIN — Medication 80 MILLIGRAM(S): at 05:46

## 2017-11-22 RX ADMIN — Medication 81 MILLIGRAM(S): at 12:11

## 2017-11-22 NOTE — PROGRESS NOTE ADULT - ASSESSMENT
42 year old obese male with history of DM with snoring at night and daytime tiredness presented with SOB and bilateral lower extremities swelling with hypertensive urgency.

## 2017-11-22 NOTE — PROGRESS NOTE ADULT - SUBJECTIVE AND OBJECTIVE BOX
Interval Events:  pt in nad    Allergies    No Known Allergies    Intolerances      Endocrine/Metabolic Medications:  atorvastatin 40 milliGRAM(s) Oral at bedtime      Vital Signs Last 24 Hrs  T(C): 36.2 (22 Nov 2017 05:23), Max: 37.1 (21 Nov 2017 14:40)  T(F): 97.1 (22 Nov 2017 05:23), Max: 98.7 (21 Nov 2017 14:40)  HR: 79 (22 Nov 2017 05:23) (79 - 89)  BP: 140/85 (22 Nov 2017 05:23) (140/85 - 156/83)  BP(mean): --  RR: 18 (22 Nov 2017 05:23) (17 - 18)  SpO2: 93% (22 Nov 2017 05:23) (92% - 96%)      PHYSICAL EXAM  All physical exam findings normal, except those marked:  General:	Alert, active, cooperative, NAD, well hydrated  .		[] Abnormal:  Neck		Normal: supple, no cervical adenopathy, no palpable thyroid  .		[] Abnormal:  Cardiovascular	Normal: regular rate, normal S1, S2, no murmurs  .		[] Abnormal:  Respiratory	Normal: no chest wall deformity, normal respiratory pattern, CTA B/L  .		[] Abnormal:  Abdominal	Normal: soft, ND, NT, bowel sounds present, no masses, no organomegaly  .		[] Abnormal:  		Normal normal genitalia, testes descended, circumcised/uncircumcised  .		Judy stage:			Breast judy:  .		Menstrual history:  .		[] Abnormal:  Extremities	Normal: FROM x4  .		[] Abnormal:  Skin		Normal: intact and not indurated, no rash, no acanthosis nigricans  .		[] Abnormal:  Neurologic	Normal: grossly intact  .		[] Abnormal:    LABS                        16.7   14.3  )-----------( 339      ( 22 Nov 2017 06:37 )             55.8                               138    |  95     |  20                  Calcium: 9.0   / iCa: x      (11-22 @ 06:37)    ----------------------------<  116       Magnesium: x                                3.1     |  36     |  1.25             Phosphorous: x          CAPILLARY BLOOD GLUCOSE      POCT Blood Glucose.: 120 mg/dL (22 Nov 2017 07:47)  POCT Blood Glucose.: 151 mg/dL (21 Nov 2017 21:56)  POCT Blood Glucose.: 103 mg/dL (21 Nov 2017 16:12)  POCT Blood Glucose.: 112 mg/dL (21 Nov 2017 12:02)        Assesment/plan    Dm - decent control  restart metformin at 500 mg bid  consider glp-1 agonists as out pt  fsg ac and hs  will sign off thanks  d/w hs Interval Events:  pt in nad    Allergies    No Known Allergies    Intolerances    Endocrine/Metabolic Medications:  atorvastatin 40 milliGRAM(s) Oral at bedtime      Vital Signs Last 24 Hrs  T(C): 36.2 (22 Nov 2017 05:23), Max: 37.1 (21 Nov 2017 14:40)  T(F): 97.1 (22 Nov 2017 05:23), Max: 98.7 (21 Nov 2017 14:40)  HR: 79 (22 Nov 2017 05:23) (79 - 89)  BP: 140/85 (22 Nov 2017 05:23) (140/85 - 156/83)  BP(mean): --  RR: 18 (22 Nov 2017 05:23) (17 - 18)  SpO2: 93% (22 Nov 2017 05:23) (92% - 96%)      PHYSICAL EXAM  All physical exam findings normal, except those marked:  General:	Alert, active, cooperative, NAD, well hydrated  .  Neck		Normal: supple, no cervical adenopathy, no palpable thyroid  .		  Cardiovascular	Normal: regular rate, normal S1, S2, no murmurs  .		  Respiratory	Normal: no chest wall deformity, normal respiratory pattern, CTA B/L  .		  Abdominal	Normal: soft, ND, NT, bowel sounds present, no masses, no organomegaly  .		  		Normal normal genitalia, testes descended, circumcised/uncircumcised  .		  Extremities	Normal: FROM x4  .		  Skin		Normal: intact and not indurated, no rash, no acanthosis nigricans  .		  Neurologic	Normal: grossly intact  .		    LABS                        16.7   14.3  )-----------( 339      ( 22 Nov 2017 06:37 )             55.8                               138    |  95     |  20                  Calcium: 9.0   / iCa: x      (11-22 @ 06:37)    ----------------------------<  116       Magnesium: x                                3.1     |  36     |  1.25             Phosphorous: x          POCT Blood Glucose.: 120 mg/dL (22 Nov 2017 07:47)  POCT Blood Glucose.: 151 mg/dL (21 Nov 2017 21:56)  POCT Blood Glucose.: 103 mg/dL (21 Nov 2017 16:12)  POCT Blood Glucose.: 112 mg/dL (21 Nov 2017 12:02)    Assesment/plan    Dm - decent control  restart metformin at 500 mg bid  consider glp-1 agonists as out pt  fsg ac and hs  will sign off thanks  d/w hs

## 2017-11-22 NOTE — DISCHARGE NOTE ADULT - HOSPITAL COURSE
43 y/o male with PMH DM presents to the ED c/o R leg swelling and SOB x 5 days. Pt states that it gets worst during exertion.ED patient BP is found to be 245/125. Patient is never known to have HTN, and does not follow up regularly with PCP. patient was also found to have b/l leg swelling with concern for underlying CHF. Patient was also found to have b/l lower extremity edema and was started on lasix 80 mg IV BID , patient was admitted to telemetry for HTN urgency . ECHO was done which showed .severe concentric left ventricular hypertrophy. Endocardium not well visualized; grossly normal left-ventricular function.. Grade II diastolic dysfunction. patient blood pressure remained controlled after admission and while he was being monitored , He had cardiology , nephrology and endocrinology evaluation done while in the hospital .us doppler was done which showed no evidence of DVT.patient was suspected to have underlying MARA and was rec to have outpatient sleep study . patient's Edema improved and blood pressure remained stable . and is stable to be discharged home on po antihypertensives and rec pulmonology and cardiology outpatient follow up 43 y/o male with PMH DM presents to the ED c/o R leg swelling and SOB x 5 days. Pt states that it gets worst during exertion.ED patient BP is found to be 245/125. Patient is never known to have HTN, and does not follow up regularly with PCP. patient was also found to have b/l leg swelling with concern for underlying CHF. Patient was also found to have b/l lower extremity edema and was started on lasix 80 mg IV BID , patient was admitted to telemetry for HTN urgency . ECHO was done which showed .severe concentric left ventricular hypertrophy. Endocardium not well visualized; grossly normal left-ventricular function.. Grade II diastolic dysfunction. patient blood pressure remained controlled after admission and while he was being monitored , He had cardiology , nephrology and endocrinology evaluation done while in the hospital .us doppler was done which showed no evidence of DVT.patient was suspected to have underlying MARA and was rec to have outpatient sleep study . patient's Edema improved and blood pressure remained stable . and is stable to be discharged home on po antihypertensives and rec pulmonology and cardiology outpatient follow up     Plan of care was discussed with patient. patient understand and agree with the plan. patient will be discharged to home in stable condition.

## 2017-11-22 NOTE — PROGRESS NOTE ADULT - PROBLEM SELECTOR PLAN 1
-with /125 on admission with evident of heart failure (bilateral lower extremities edema with SOB)  -likely chronic with echo showing hypertrophy of LV.   -HTN likely from MARA (obese, snore at night, tired daytime)  -BP goal for today 11/22 -140/80  Avoid rapid correction of BP. Please use large cuff when checking BP.   -Continue with Lasix, Enalapril and Coreg.  -Consider outpatient polysomnograph  TSH wnl. No need for pheo w/u since pt asymptomatic. Can check scott/ renin ratio. Drug tox neg.   monitor BP.

## 2017-11-22 NOTE — PROGRESS NOTE ADULT - PROBLEM SELECTOR PLAN 3
HbA1C 7.1 and favorable control with metformin 500mg daily. Pt given KCl 40 meq PO x1. Monitor lytes

## 2017-11-22 NOTE — PROGRESS NOTE ADULT - ATTENDING COMMENTS
Patient seen and examined. Agree with plan above.  Note edited as necessary.  [facility] NEPHROLOGY  Abel Del Cid M.D.  Denny Brown D.O.  Mariia Chahal M.D.  Piedad Morton, MSN, ANP-C  [phone]    [address] [phone]

## 2017-11-22 NOTE — DISCHARGE NOTE ADULT - INSTRUCTIONS
Diet appropriate to avoid systolic hypertension. Sodium in take < 2 g daily. diabetic diet avoid processed food.

## 2017-11-22 NOTE — PROGRESS NOTE ADULT - SUBJECTIVE AND OBJECTIVE BOX
42 year old obese male with history of DM on metformin presented for shortness on breath on exacerbation and need 4 pillows to sleep at night and right leg swelling. In ED patient BP is found to be 245/125. Patient is never known to have HTN, and does not follow up regularly with PCP. Denied headache, palpitation, vision changes or chest pain. Patient admit snoring at night and feeling tired in daytime for the last 2 weeks.   Pt denies any episodes of HA with diaphoresis   Pt denies any drug use.     Meds:    carvedilol 6.25 milliGRAM(s) Oral every 12 hours  enalapril 10 milliGRAM(s) Oral daily  furosemide   Injectable 80 milliGRAM(s) IV Push every 12 hours    REVIEW OF SYSTEMS:    CONSTITUTIONAL: No weakness, fevers or chills  EYES/ENT: No visual changes;  No vertigo or throat pain   NECK: No pain or stiffness  RESPIRATORY: No cough, wheezing, hemoptysis; SOB as mentioned above  CARDIOVASCULAR: No chest pain or palpitations  GASTROINTESTINAL: No abdominal or epigastric pain. No nausea, vomiting, or hematemesis; No diarrhea or constipation. No melena or hematochezia.  GENITOURINARY: No dysuria, frequency or hematuria  NEUROLOGICAL: No numbness or weakness  SKIN: No itching, rashes  EXT: +b/l LE Edema  No other complaint except mentioned as above.     VITALS:  Vital Signs Last 24 Hrs  T(C): 36.2 (22 Nov 2017 05:23), Max: 37.1 (21 Nov 2017 14:40)  T(F): 97.1 (22 Nov 2017 05:23), Max: 98.7 (21 Nov 2017 14:40)  HR: 79 (22 Nov 2017 05:23) (79 - 89)  BP: 140/85 (22 Nov 2017 05:23) (140/85 - 156/83)  BP(mean): --  RR: 18 (22 Nov 2017 05:23) (17 - 18)  SpO2: 93% (22 Nov 2017 05:23) (92% - 96%)    PHYSICAL EXAM:    GENERAL: NAD, well-developed    HEAD:  Atraumatic, Normocephalic    EYES: EOMI, PERRLA, conjunctiva and sclera clear    NECK: Supple, No JVD    CHEST/LUNG: Clear to auscultation bilaterally; No wheeze    HEART: Regular rate and rhythm; No murmurs, rubs, or gallops    ABDOMEN: Soft, Nontender, Nondistended; Bowel sounds present    EXTREMITIES:  2+ Peripheral Pulses, No clubbing, cyanosis, 3+edema on right lower leg and 2+ edema on left lower leg.   and skin discoloration on bilateral lower extremities (likely from uncontrolled DM)    PSYCH: AAOx3    NEUROLOGY: non-focal    SKIN: No rashes or lesions    No other pertinent positive finding except mentioned as above.     LABS/DIAGNOSTIC TESTS:                          16.7   14.3  )-----------( 339      ( 22 Nov 2017 06:37 )             55.8     WBC Count: 14.3 K/uL (11-22 @ 06:37)  WBC Count: 13.2 K/uL (11-20 @ 13:39)      11-22    138  |  95<L>  |  20<H>  ----------------------------<  116<H>  3.1<L>   |  36<H>  |  1.25    Ca    9.0      22 Nov 2017 06:37  Phos  3.9     11-21  Mg     1.9     11-21    TPro  7.3  /  Alb  3.1<L>  /  TBili  1.3<H>  /  DBili  x   /  AST  20  /  ALT  44  /  AlkPhos  100  11-20          LIVER FUNCTIONS - ( 20 Nov 2017 13:39 )  Alb: 3.1 g/dL / Pro: 7.3 g/dL / ALK PHOS: 100 U/L / ALT: 44 U/L DA / AST: 20 U/L / GGT: x             PT/INR - ( 20 Nov 2017 13:39 )   PT: 13.0 sec;   INR: 1.19 ratio         PTT - ( 20 Nov 2017 13:39 )  PTT:30.4 sec

## 2017-11-22 NOTE — DISCHARGE NOTE ADULT - PATIENT PORTAL LINK FT
“You can access the Follow[employer] Patient Portal, offered by [facility], by registering with the following website: [email or web address]”

## 2017-11-22 NOTE — DISCHARGE NOTE ADULT - CARE PLAN
Principal Discharge DX:	Hypertension  Goal:	to control blood pressure  Instructions for follow-up, activity and diet:	you were admitted for HTN urgency and had very elevated blood pressure on admission . your blood pressure is now well controlled , please take your prescribed medications and [facility]  Secondary Diagnosis:	CHF (congestive heart failure)  Goal:	treat symptoms prevent complications  Instructions for follow-up, activity and diet:	your ECHO from this hospital admission shows that your heart has strain and increased size due to uncontrolled blood pressure which could be causing the leg swelling ( edema) , please continue taking your prescribed medications and lasix 40 mg po two times a day to reduce swelling . please follow up with your PCP in 2 weeks  Secondary Diagnosis:	DM (diabetes mellitus)  Goal:	control blood sugar levels  Instructions for follow-up, activity and diet:	please take metformin 500 mg BID and consider taking Victoza once a week as outpatient , you were evaluated by endocrinology on this admission ,  Secondary Diagnosis:	Obesity  Instructions for follow-up, activity and diet:	we enocourage healthy diet and exercise , due to your weight we suspect that you might have underling obstructive sleep apnea , please f/u with your PCP and get sleep study scheduled as outpatient

## 2017-11-22 NOTE — DISCHARGE NOTE ADULT - PLAN OF CARE
to control blood pressure you were admitted for HTN urgency and had very elevated blood pressure on admission . your blood pressure is now well controlled , please take your prescribed medications and follow up with [facility] primary doctor as outpatient treat symptoms prevent complications your ECHO from this hospital admission shows that your heart has strain and increased size due to uncontrolled blood pressure which could be causing the leg swelling ( edema) , please continue taking your prescribed medications and lasix 40 mg po two times a day to reduce swelling . please follow up with your PCP in 2 weeks control blood sugar levels please take metformin 500 mg BID and consider taking Victoza once a week as outpatient , you were evaluated by endocrinology on this admission , we enocourage healthy diet and exercise , due to your weight we suspect that you might have underling obstructive sleep apnea , please f/u with your PCP and get sleep study scheduled as outpatient

## 2017-11-22 NOTE — DISCHARGE NOTE ADULT - MEDICATION SUMMARY - MEDICATIONS TO TAKE
I will START or STAY ON the medications listed below when I get home from the hospital:    aspirin 81 mg oral tablet, chewable  -- 1 tab(s) by mouth once a day  -- Indication: For Cardioprotective    enalapril 10 mg oral tablet  -- 1 tab(s) by mouth once a day  -- Indication: For Hypertension    metFORMIN 500 mg oral tablet  -- 1 tab(s) by mouth 2 times a day   -- Check with your doctor before becoming pregnant.  Do not drink alcoholic beverages when taking this medication.  It is very important that you take or use this exactly as directed.  Do not skip doses or discontinue unless directed by your doctor.  Obtain medical advice before taking any non-prescription drugs as some may affect the action of this medication.  Take with food or milk.    -- Indication: For Diabetes    atorvastatin 40 mg oral tablet  -- 1 tab(s) by mouth once a day (at bedtime)  -- Indication: For Hyperlipidemia    carvedilol 6.25 mg oral tablet  -- 1 tab(s) by mouth every 12 hours  -- Indication: For Hypertension    Lasix 40 mg oral tablet  -- 1 tab(s) by mouth 2 times a day  -- Indication: For Hypertension

## 2017-11-22 NOTE — PROGRESS NOTE ADULT - PROBLEM SELECTOR PLAN 2
-continue Lasix  Management as per primary team and cardio. -Pt with mild metabolic alkalosis. Consider changing IV Lasix to PO. Recc Diamox 500mg Iv x1.   Management as per primary team and cardio.

## 2017-11-22 NOTE — DISCHARGE NOTE ADULT - CARE PROVIDER_API CALL
Jose Alfredo Lawton (DIMITRIS), Internal Medicine; Pulmonary Disease  [address] [place] [address]  Phone: [phone]  Fax: [phone]    Simran Estes (DIMITRIS), EndocrinologyMetabDiabetes  [address] [place] [address]  Phone: [phone]  Fax: [phone]    Abel Del Cid), Internal Medicine; Nephrology  [address] [place] [address]  Phone: [phone]  Fax: [phone]

## 2018-03-22 ENCOUNTER — INPATIENT (INPATIENT)
Facility: HOSPITAL | Age: 43
LOS: 2 days | Discharge: LEFT BEFORE DISCHARGE | DRG: 291 | End: 2018-03-25
Attending: FAMILY MEDICINE | Admitting: FAMILY MEDICINE
Payer: COMMERCIAL

## 2018-03-22 VITALS
DIASTOLIC BLOOD PRESSURE: 120 MMHG | WEIGHT: 315 LBS | SYSTOLIC BLOOD PRESSURE: 190 MMHG | OXYGEN SATURATION: 90 % | RESPIRATION RATE: 20 BRPM | HEART RATE: 86 BPM | HEIGHT: 67 IN | TEMPERATURE: 98 F

## 2018-03-22 LAB
ALBUMIN SERPL ELPH-MCNC: 2.5 G/DL — LOW (ref 3.5–5)
ALP SERPL-CCNC: 123 U/L — HIGH (ref 40–120)
ALT FLD-CCNC: 38 U/L DA — SIGNIFICANT CHANGE UP (ref 10–60)
ANION GAP SERPL CALC-SCNC: 7 MMOL/L — SIGNIFICANT CHANGE UP (ref 5–17)
APTT BLD: 29.2 SEC — SIGNIFICANT CHANGE UP (ref 27.5–37.4)
AST SERPL-CCNC: 30 U/L — SIGNIFICANT CHANGE UP (ref 10–40)
BASE EXCESS BLDA CALC-SCNC: 8.8 MMOL/L — HIGH (ref -2–2)
BASOPHILS # BLD AUTO: 0.1 K/UL — SIGNIFICANT CHANGE UP (ref 0–0.2)
BILIRUB SERPL-MCNC: 1.4 MG/DL — HIGH (ref 0.2–1.2)
BLOOD GAS COMMENTS ARTERIAL: SIGNIFICANT CHANGE UP
BUN SERPL-MCNC: 25 MG/DL — HIGH (ref 7–18)
CALCIUM SERPL-MCNC: 8.6 MG/DL — SIGNIFICANT CHANGE UP (ref 8.4–10.5)
CHLORIDE SERPL-SCNC: 99 MMOL/L — SIGNIFICANT CHANGE UP (ref 96–108)
CK SERPL-CCNC: 366 U/L — HIGH (ref 35–232)
CO2 SERPL-SCNC: 32 MMOL/L — HIGH (ref 22–31)
CREAT SERPL-MCNC: 1.65 MG/DL — HIGH (ref 0.5–1.3)
EOSINOPHIL # BLD AUTO: 0.3 K/UL — SIGNIFICANT CHANGE UP (ref 0–0.5)
GLUCOSE SERPL-MCNC: 110 MG/DL — HIGH (ref 70–99)
HCO3 BLDA-SCNC: 35 MMOL/L — HIGH (ref 23–27)
HCT VFR BLD CALC: 50.6 % — HIGH (ref 39–50)
HGB BLD-MCNC: 15 G/DL — SIGNIFICANT CHANGE UP (ref 13–17)
HOROWITZ INDEX BLDA+IHG-RTO: 30 — SIGNIFICANT CHANGE UP
INR BLD: 1.27 RATIO — HIGH (ref 0.88–1.16)
LYMPHOCYTES # BLD AUTO: 1.5 K/UL — SIGNIFICANT CHANGE UP (ref 1–3.3)
LYMPHOCYTES # BLD AUTO: 11 % — LOW (ref 13–44)
MCHC RBC-ENTMCNC: 24.3 PG — LOW (ref 27–34)
MCHC RBC-ENTMCNC: 29.7 GM/DL — LOW (ref 32–36)
MCV RBC AUTO: 82 FL — SIGNIFICANT CHANGE UP (ref 80–100)
MONOCYTES # BLD AUTO: 0.6 K/UL — SIGNIFICANT CHANGE UP (ref 0–0.9)
MONOCYTES NFR BLD AUTO: 7 % — SIGNIFICANT CHANGE UP (ref 2–14)
NEUTROPHILS # BLD AUTO: 10.8 K/UL — HIGH (ref 1.8–7.4)
NEUTROPHILS NFR BLD AUTO: 82 % — HIGH (ref 43–77)
NT-PROBNP SERPL-SCNC: 1572 PG/ML — HIGH (ref 0–125)
PCO2 BLDA: 55 MMHG — HIGH (ref 32–46)
PH BLDA: 7.42 — SIGNIFICANT CHANGE UP (ref 7.35–7.45)
PLATELET # BLD AUTO: 369 K/UL — SIGNIFICANT CHANGE UP (ref 150–400)
PO2 BLDA: 64 MMHG — LOW (ref 74–108)
POTASSIUM SERPL-MCNC: 3.5 MMOL/L — SIGNIFICANT CHANGE UP (ref 3.5–5.3)
POTASSIUM SERPL-SCNC: 3.5 MMOL/L — SIGNIFICANT CHANGE UP (ref 3.5–5.3)
PROT SERPL-MCNC: 6.8 G/DL — SIGNIFICANT CHANGE UP (ref 6–8.3)
PROTHROM AB SERPL-ACNC: 13.9 SEC — HIGH (ref 9.8–12.7)
RBC # BLD: 6.17 M/UL — HIGH (ref 4.2–5.8)
RBC # FLD: 18.4 % — HIGH (ref 10.3–14.5)
SAO2 % BLDA: 90 % — LOW (ref 92–96)
SODIUM SERPL-SCNC: 138 MMOL/L — SIGNIFICANT CHANGE UP (ref 135–145)
TROPONIN I SERPL-MCNC: 0.12 NG/ML — HIGH (ref 0–0.04)
WBC # BLD: 13.1 K/UL — HIGH (ref 3.8–10.5)
WBC # FLD AUTO: 13.1 K/UL — HIGH (ref 3.8–10.5)

## 2018-03-22 PROCEDURE — 71046 X-RAY EXAM CHEST 2 VIEWS: CPT | Mod: 26

## 2018-03-22 PROCEDURE — 76705 ECHO EXAM OF ABDOMEN: CPT | Mod: 26

## 2018-03-22 RX ORDER — METOPROLOL TARTRATE 50 MG
25 TABLET ORAL
Qty: 0 | Refills: 0 | Status: DISCONTINUED | OUTPATIENT
Start: 2018-03-22 | End: 2018-03-22

## 2018-03-22 RX ORDER — FUROSEMIDE 40 MG
40 TABLET ORAL EVERY 12 HOURS
Qty: 0 | Refills: 0 | Status: DISCONTINUED | OUTPATIENT
Start: 2018-03-23 | End: 2018-03-23

## 2018-03-22 RX ORDER — CARVEDILOL PHOSPHATE 80 MG/1
6.25 CAPSULE, EXTENDED RELEASE ORAL EVERY 12 HOURS
Qty: 0 | Refills: 0 | Status: DISCONTINUED | OUTPATIENT
Start: 2018-03-22 | End: 2018-03-25

## 2018-03-22 RX ORDER — INSULIN LISPRO 100/ML
VIAL (ML) SUBCUTANEOUS AT BEDTIME
Qty: 0 | Refills: 0 | Status: DISCONTINUED | OUTPATIENT
Start: 2018-03-22 | End: 2018-03-25

## 2018-03-22 RX ORDER — ASPIRIN/CALCIUM CARB/MAGNESIUM 324 MG
81 TABLET ORAL DAILY
Qty: 0 | Refills: 0 | Status: DISCONTINUED | OUTPATIENT
Start: 2018-03-22 | End: 2018-03-25

## 2018-03-22 RX ORDER — FUROSEMIDE 40 MG
60 TABLET ORAL ONCE
Qty: 0 | Refills: 0 | Status: COMPLETED | OUTPATIENT
Start: 2018-03-22 | End: 2018-03-22

## 2018-03-22 RX ORDER — ACETAMINOPHEN 500 MG
650 TABLET ORAL EVERY 6 HOURS
Qty: 0 | Refills: 0 | Status: DISCONTINUED | OUTPATIENT
Start: 2018-03-22 | End: 2018-03-25

## 2018-03-22 RX ORDER — LABETALOL HCL 100 MG
20 TABLET ORAL ONCE
Qty: 0 | Refills: 0 | Status: COMPLETED | OUTPATIENT
Start: 2018-03-22 | End: 2018-03-22

## 2018-03-22 RX ORDER — INSULIN LISPRO 100/ML
VIAL (ML) SUBCUTANEOUS
Qty: 0 | Refills: 0 | Status: DISCONTINUED | OUTPATIENT
Start: 2018-03-22 | End: 2018-03-25

## 2018-03-22 RX ORDER — NITROGLYCERIN 6.5 MG
1 CAPSULE, EXTENDED RELEASE ORAL EVERY 24 HOURS
Qty: 0 | Refills: 0 | Status: DISCONTINUED | OUTPATIENT
Start: 2018-03-22 | End: 2018-03-23

## 2018-03-22 RX ORDER — ATORVASTATIN CALCIUM 80 MG/1
40 TABLET, FILM COATED ORAL AT BEDTIME
Qty: 0 | Refills: 0 | Status: DISCONTINUED | OUTPATIENT
Start: 2018-03-22 | End: 2018-03-25

## 2018-03-22 RX ORDER — FUROSEMIDE 40 MG
40 TABLET ORAL ONCE
Qty: 0 | Refills: 0 | Status: COMPLETED | OUTPATIENT
Start: 2018-03-22 | End: 2018-03-22

## 2018-03-22 RX ORDER — HEPARIN SODIUM 5000 [USP'U]/ML
INJECTION INTRAVENOUS; SUBCUTANEOUS
Qty: 25000 | Refills: 0 | Status: DISCONTINUED | OUTPATIENT
Start: 2018-03-22 | End: 2018-03-23

## 2018-03-22 RX ADMIN — Medication 1 INCH(S): at 23:49

## 2018-03-22 RX ADMIN — Medication 20 MILLIGRAM(S): at 23:03

## 2018-03-22 RX ADMIN — ATORVASTATIN CALCIUM 40 MILLIGRAM(S): 80 TABLET, FILM COATED ORAL at 23:49

## 2018-03-22 RX ADMIN — Medication 81 MILLIGRAM(S): at 23:49

## 2018-03-22 RX ADMIN — Medication 40 MILLIGRAM(S): at 23:11

## 2018-03-22 RX ADMIN — CARVEDILOL PHOSPHATE 6.25 MILLIGRAM(S): 80 CAPSULE, EXTENDED RELEASE ORAL at 23:49

## 2018-03-22 NOTE — ED PROVIDER NOTE - MEDICAL DECISION MAKING DETAILS
pt w/ low o2 sats, likely due to fluid overload, will admit for cardiac management. Pt w/ low o2 sats, likely due to fluid overload, will admit for cardiac management.  Labs reveal renal insuffiencey, positive troponin, elevated pro-bnp. sat 94% on 2 liters NC  patient evaluated by ICU for HTNsive emergency, received IV labetalol.  Downgraded to tele.

## 2018-03-22 NOTE — ED PROVIDER NOTE - OBJECTIVE STATEMENT
43 y/o M pt w/ PMHx of DM and HTN, presents to ED c/o diaphoresis, dizziness, chest pain and dyspnea on exertion x 4 days. Pt reports he gets these sx when he walks, better with rest. Pt states he has never had this before. Pt notes is compliant with his meds, takes 1 blood pressure pill daily, last taken at 3pm. Pt also reports + leg swelling since last Thanksgiving. Pt states he was seen here in ED at that time where his blood pressure was monitored for 3 days and he was started on water pills. Pt also states his stomach feels tight "like a brick." Pt also notes he has a cough that won't go away, worse when he lies down. Pt is a non-smoker. Pt denies fever, chills, vomiting, recent illness, or any other complaints. NKDA. 43 y/o M pt w/ PMHx of DM and HTN, presents to ED c/o diaphoresis, dizziness, chest pain and dyspnea on exertion x 4 days. Pt reports he gets these sx when he walks, better with rest. Pt states he has never had this before. Pt notes is compliant with his meds, takes 1 blood pressure pill daily, last taken at 3pm. Pt also reports + leg swelling since last Thanksgiving. Pt states he was seen here in ED at that time where his blood pressure was monitored for 3 days and he was started on water pills. Pt also states his stomach feels tight "like a brick." Pt also notes he has a cough that won't go away, and shortness of breath worse when he lies down. Also notes chest pain. Pt is a non-smoker. Pt denies fever, chills, vomiting, recent illness, or any other complaints. NKDA.

## 2018-03-23 DIAGNOSIS — Z29.9 ENCOUNTER FOR PROPHYLACTIC MEASURES, UNSPECIFIED: ICD-10-CM

## 2018-03-23 DIAGNOSIS — I50.33 ACUTE ON CHRONIC DIASTOLIC (CONGESTIVE) HEART FAILURE: ICD-10-CM

## 2018-03-23 DIAGNOSIS — N17.9 ACUTE KIDNEY FAILURE, UNSPECIFIED: ICD-10-CM

## 2018-03-23 DIAGNOSIS — I16.1 HYPERTENSIVE EMERGENCY: ICD-10-CM

## 2018-03-23 DIAGNOSIS — E11.9 TYPE 2 DIABETES MELLITUS WITHOUT COMPLICATIONS: ICD-10-CM

## 2018-03-23 DIAGNOSIS — R74.8 ABNORMAL LEVELS OF OTHER SERUM ENZYMES: ICD-10-CM

## 2018-03-23 DIAGNOSIS — I16.0 HYPERTENSIVE URGENCY: ICD-10-CM

## 2018-03-23 LAB
ANION GAP SERPL CALC-SCNC: 6 MMOL/L — SIGNIFICANT CHANGE UP (ref 5–17)
BUN SERPL-MCNC: 24 MG/DL — HIGH (ref 7–18)
CALCIUM SERPL-MCNC: 8.8 MG/DL — SIGNIFICANT CHANGE UP (ref 8.4–10.5)
CHLORIDE SERPL-SCNC: 98 MMOL/L — SIGNIFICANT CHANGE UP (ref 96–108)
CHOLEST SERPL-MCNC: 117 MG/DL — SIGNIFICANT CHANGE UP (ref 10–199)
CK MB BLD-MCNC: 0.9 % — SIGNIFICANT CHANGE UP (ref 0–3.5)
CK MB CFR SERPL CALC: 3.6 NG/ML — SIGNIFICANT CHANGE UP (ref 0–3.6)
CK SERPL-CCNC: 389 U/L — HIGH (ref 35–232)
CO2 SERPL-SCNC: 35 MMOL/L — HIGH (ref 22–31)
CREAT SERPL-MCNC: 1.53 MG/DL — HIGH (ref 0.5–1.3)
GLUCOSE BLDC GLUCOMTR-MCNC: 111 MG/DL — HIGH (ref 70–99)
GLUCOSE BLDC GLUCOMTR-MCNC: 115 MG/DL — HIGH (ref 70–99)
GLUCOSE BLDC GLUCOMTR-MCNC: 126 MG/DL — HIGH (ref 70–99)
GLUCOSE BLDC GLUCOMTR-MCNC: 98 MG/DL — SIGNIFICANT CHANGE UP (ref 70–99)
GLUCOSE SERPL-MCNC: 152 MG/DL — HIGH (ref 70–99)
HCT VFR BLD CALC: 52.8 % — HIGH (ref 39–50)
HDLC SERPL-MCNC: 37 MG/DL — LOW (ref 40–125)
HGB BLD-MCNC: 15.4 G/DL — SIGNIFICANT CHANGE UP (ref 13–17)
LIPID PNL WITH DIRECT LDL SERPL: 50 MG/DL — SIGNIFICANT CHANGE UP
MAGNESIUM SERPL-MCNC: 1.4 MG/DL — LOW (ref 1.6–2.6)
MCHC RBC-ENTMCNC: 24 PG — LOW (ref 27–34)
MCHC RBC-ENTMCNC: 29.2 GM/DL — LOW (ref 32–36)
MCV RBC AUTO: 82.3 FL — SIGNIFICANT CHANGE UP (ref 80–100)
PHOSPHATE SERPL-MCNC: 4.4 MG/DL — SIGNIFICANT CHANGE UP (ref 2.5–4.5)
PLATELET # BLD AUTO: 388 K/UL — SIGNIFICANT CHANGE UP (ref 150–400)
POTASSIUM SERPL-MCNC: 3.2 MMOL/L — LOW (ref 3.5–5.3)
POTASSIUM SERPL-SCNC: 3.2 MMOL/L — LOW (ref 3.5–5.3)
RAPID RVP RESULT: SIGNIFICANT CHANGE UP
RBC # BLD: 6.42 M/UL — HIGH (ref 4.2–5.8)
RBC # FLD: 18.8 % — HIGH (ref 10.3–14.5)
SODIUM SERPL-SCNC: 139 MMOL/L — SIGNIFICANT CHANGE UP (ref 135–145)
TOTAL CHOLESTEROL/HDL RATIO MEASUREMENT: 3.2 RATIO — LOW (ref 3.4–9.6)
TRIGL SERPL-MCNC: 149 MG/DL — SIGNIFICANT CHANGE UP (ref 10–149)
TROPONIN I SERPL-MCNC: 0.11 NG/ML — HIGH (ref 0–0.04)
TSH SERPL-MCNC: 3.09 UU/ML — SIGNIFICANT CHANGE UP (ref 0.34–4.82)
WBC # BLD: 13.8 K/UL — HIGH (ref 3.8–10.5)
WBC # FLD AUTO: 13.8 K/UL — HIGH (ref 3.8–10.5)

## 2018-03-23 PROCEDURE — 99291 CRITICAL CARE FIRST HOUR: CPT

## 2018-03-23 RX ORDER — ISOSORBIDE MONONITRATE 60 MG/1
30 TABLET, EXTENDED RELEASE ORAL DAILY
Qty: 0 | Refills: 0 | Status: DISCONTINUED | OUTPATIENT
Start: 2018-03-23 | End: 2018-03-25

## 2018-03-23 RX ORDER — POTASSIUM CHLORIDE 20 MEQ
40 PACKET (EA) ORAL EVERY 4 HOURS
Qty: 0 | Refills: 0 | Status: COMPLETED | OUTPATIENT
Start: 2018-03-23 | End: 2018-03-23

## 2018-03-23 RX ORDER — HEPARIN SODIUM 5000 [USP'U]/ML
5000 INJECTION INTRAVENOUS; SUBCUTANEOUS EVERY 8 HOURS
Qty: 0 | Refills: 0 | Status: DISCONTINUED | OUTPATIENT
Start: 2018-03-23 | End: 2018-03-25

## 2018-03-23 RX ORDER — INFLUENZA VIRUS VACCINE 15; 15; 15; 15 UG/.5ML; UG/.5ML; UG/.5ML; UG/.5ML
0.5 SUSPENSION INTRAMUSCULAR ONCE
Qty: 0 | Refills: 0 | Status: COMPLETED | OUTPATIENT
Start: 2018-03-23 | End: 2018-03-23

## 2018-03-23 RX ORDER — FUROSEMIDE 40 MG
80 TABLET ORAL
Qty: 0 | Refills: 0 | Status: DISCONTINUED | OUTPATIENT
Start: 2018-03-23 | End: 2018-03-25

## 2018-03-23 RX ORDER — HYDRALAZINE HCL 50 MG
25 TABLET ORAL EVERY 8 HOURS
Qty: 0 | Refills: 0 | Status: DISCONTINUED | OUTPATIENT
Start: 2018-03-23 | End: 2018-03-25

## 2018-03-23 RX ADMIN — HEPARIN SODIUM 5000 UNIT(S): 5000 INJECTION INTRAVENOUS; SUBCUTANEOUS at 21:37

## 2018-03-23 RX ADMIN — Medication 80 MILLIGRAM(S): at 14:00

## 2018-03-23 RX ADMIN — Medication 40 MILLIEQUIVALENT(S): at 14:00

## 2018-03-23 RX ADMIN — Medication 81 MILLIGRAM(S): at 14:02

## 2018-03-23 RX ADMIN — CARVEDILOL PHOSPHATE 6.25 MILLIGRAM(S): 80 CAPSULE, EXTENDED RELEASE ORAL at 06:36

## 2018-03-23 RX ADMIN — Medication 25 MILLIGRAM(S): at 21:37

## 2018-03-23 RX ADMIN — Medication 80 MILLIGRAM(S): at 17:46

## 2018-03-23 RX ADMIN — Medication 40 MILLIEQUIVALENT(S): at 10:46

## 2018-03-23 RX ADMIN — ATORVASTATIN CALCIUM 40 MILLIGRAM(S): 80 TABLET, FILM COATED ORAL at 21:37

## 2018-03-23 RX ADMIN — HEPARIN SODIUM 5000 UNIT(S): 5000 INJECTION INTRAVENOUS; SUBCUTANEOUS at 14:02

## 2018-03-23 RX ADMIN — CARVEDILOL PHOSPHATE 6.25 MILLIGRAM(S): 80 CAPSULE, EXTENDED RELEASE ORAL at 17:46

## 2018-03-23 RX ADMIN — Medication 1 INCH(S): at 17:47

## 2018-03-23 NOTE — H&P ADULT - NSHPREVIEWOFSYSTEMS_GEN_ALL_CORE
REVIEW OF SYSTEMS:    CONSTITUTIONAL: No weakness, fevers or chills  EYES/ENT: No visual changes;  No vertigo or throat pain   NECK: No pain or stiffness  RESPIRATORY: No cough, wheezing, hemoptysis; + shortness of breath on exertion  CARDIOVASCULAR: chest pressure sensation with exertion without palpitation  GASTROINTESTINAL: No abdominal or epigastric pain. No nausea, vomiting, or hematemesis; No diarrhea or constipation. No melena or hematochezia.  GENITOURINARY: No dysuria, frequency or hematuria  NEUROLOGICAL: No numbness or weakness  EXT; bilateral lower extremities swelling   SKIN: No itching, rashes  No other complaint except mentioned as above.

## 2018-03-23 NOTE — H&P ADULT - HISTORY OF PRESENT ILLNESS
43yo male from home, lives alone, works as , well known to resident from previous admission PMH morbid obesity, MARA not on CPAP,  DM, HTN, HFpEF (TTE in 11/2017 showed Grade II DD) p/w 4 days of dyspnea on exertion, 4 days of non radiating epigastric/lower chest pressure with exertion. Patient endorses epigastric/lower chest pain pressure, which is exacerbated with exertion and resolves with rests and also point to the lower abd and stated that pain comes with tearing sensation  and bloating from edema.  Dyspnea with exertion during past 4 days and can not lie flat at night.  Patient saw PMD yesterday who noted a low grade temp of 99.  Patient was recently admitted to [facility] in 11/2017 for leg edema from medication non compliance, diuresed; TTE at that time showed preserved EF with Grade II DD.  Patient states is adherent to medications 80% of time.  ROS + dizziness, 4-pillow orthopnea.  Denies fever, chills, change in vision, headache, nausea/vomiting, diarrhea, dysuria, hematuria, hematochezia.  States leg swelling is stable, denies leg pain.  Had recent polysomnography, diagnosed with sleep apnea, but does not have CPAP machine at home.  Stated no changes to his home medication regime since last admission.

## 2018-03-23 NOTE — H&P ADULT - PROBLEM SELECTOR PLAN 4
-likely from acute on chronic CHF exacerbation and/or [facility] emergency  -likely medication non compliance  -Lasix 40mg IV BID  -monitor UO -likely from acute on chronic CHF exacerbation and/or [facility] emergency  -likely medication non compliance  -Lasix 40mg IV BID  -monitor UO  _nephro consult Dr Porter as per attending

## 2018-03-23 NOTE — H&P ADULT - NSHPPHYSICALEXAM_GEN_ALL_CORE
PHYSICAL EXAM:    GENERAL: NAD, well-developed    HEAD:  Atraumatic, Normocephalic    EYES: EOMI, PERRLA, conjunctiva and sclera clear    NECK: Supple, No JVD    CHEST/LUNG: Clear to auscultation bilaterally; No wheeze    HEART: Regular rate and rhythm; No murmurs, rubs, or gallops    ABDOMEN: Soft, Nontender, Nondistended; Bowel sounds present    EXTREMITIES:  2+ Peripheral Pulses, No clubbing, cyanosis,   Bilateral lower extremities 3+ pitting  edema up to abd    PSYCH: AAOx3    NEUROLOGY: non-focal    SKIN: No rashes or lesions    No other pertinent positive finding except mentioned as above.

## 2018-03-23 NOTE — CONSULT NOTE ADULT - PROBLEM SELECTOR RECOMMENDATION 9
SLATER, BNP ~ 1500, CXR with mild congestion possibly 2/2 nonadherence to medication.  Bedside us without B lines or effusions. However given lower chest pressure and tropinemia, will admit to telemetry for ACS workup.    EKG without acute ischemic changes  - ASA, statin, restart coreg, nitropaste, heparin gtt SLATER, BNP ~ 1500, CXR with mild congestion.  CHF exacerbation possibly 2/2 nonadherence to medication.  Bedside ultrasound without B lines or effusions. However given lower chest pressure and tropinemia, will admit to telemetry for ACS workup.  EKG without acute ischemic changes  - ASA, statin, restart coreg (home med), nitropaste, heparin gtt  - trend troponins  - no need for repeat TTE  - cardiology consult in AM - will likely need stress test

## 2018-03-23 NOTE — H&P ADULT - NSHPLABSRESULTS_GEN_ALL_CORE
Vital Signs Last 24 Hrs  T(C): 37 (23 Mar 2018 00:27), Max: 37 (23 Mar 2018 00:27)  T(F): 98.6 (23 Mar 2018 00:27), Max: 98.6 (23 Mar 2018 00:27)  HR: 76 (23 Mar 2018 00:27) (68 - 86)  BP: 162/114 (23 Mar 2018 00:27) (151/124 - 190/120)  BP(mean): --  RR: 22 (23 Mar 2018 00:27) (11 - 22)  SpO2: 96% (23 Mar 2018 00:27) (83% - 96%)        Labs:                        15.0   13.1  )-----------( 369      ( 22 Mar 2018 22:05 )             50.6     03-22    138  |  99  |  25<H>  ----------------------------<  110<H>  3.5   |  32<H>  |  1.65<H>    Ca    8.6      22 Mar 2018 22:05    TPro  6.8  /  Alb  2.5<L>  /  TBili  1.4<H>  /  DBili  x   /  AST  30  /  ALT  38  /  AlkPhos  123<H>  03-22    < from: Xray Chest 2 Views PA/Lat (03.22.18 @ 22:11) >    Impression:    No significant interval change compared to the previous study of November 20, 2017.  Cardiomegaly.  No acute cardiopulmonary process.    < end of copied text >

## 2018-03-23 NOTE — H&P ADULT - PROBLEM SELECTOR PLAN 3
-EKG with normal sinus rhythm and right ventricular hypertrophy and right axis deviation  -Troponin 0.119, HEART score 5  -on heparin gtt as per ICU consult as high risk for ACS  -cardio consult

## 2018-03-23 NOTE — H&P ADULT - PROBLEM SELECTOR PLAN 1
-CHF exacerbation possibly 2/2 nonadherence to medication.   -SOB on exertion and lying flat  - Bedside ultrasound without B lines or effusions.   - BNP ~ 1500, CXR with mild congestion.    - EKG without acute ischemic changes  - ASA, statin, restart coreg (home med), nitropaste, heparin gtt  - trend troponin  - no need for repeat TTE  - cardiology consult in AM - will likely need stress test. -CHF exacerbation possibly 2/2 nonadherence to medication.   -SOB on exertion and lying flat  - Bedside ultrasound without B lines or effusions.   - BNP ~ 1500, CXR with mild congestion.    - EKG without acute ischemic changes  - ASA, statin, restart coreg (home med), nitropaste, heparin gtt  - trend troponin  - no need for repeat TTE  - cardiology consult Dr Hoffmann as per attending

## 2018-03-23 NOTE — CONSULT NOTE ADULT - SUBJECTIVE AND OBJECTIVE BOX
CHIEF COMPLAINT:Patient is a 42y old  Male who presents with a chief complaint of SOB .      HPI:  42 yr old male from home, lives alone, works as , well known to resident from previous admission PMH morbid obesity, MARA not on CPAP,  DM, HTN, HFpEF (TTE in 11/2017 showed Grade II DD) p/w 4 days of dyspnea on exertion, 4 days of non radiating epigastric/lower chest pressure with exertion. Patient endorses epigastric/lower chest pain pressure, which is exacerbated with exertion and resolves with rests and also point to the lower abd and stated that pain comes with tearing sensation  and bloating from edema.  Dyspnea with exertion during past 4 days and can not lie flat at night.  Patient saw PMD yesterday who noted a low grade temp of 99.  [facility] edema from medication non compliance, diuresed; TTE at that time showed preserved EF with Grade II DD.  Patient states is adherent to medications 80% of time.  ROS + dizziness, 4-pillow orthopnea.  Denies fever, chills, change in vision, headache, nausea/vomiting, diarrhea, dysuria, hematuria, hematochezia.  States leg swelling is stable, denies leg pain.  Had recent polysomnography, diagnosed with sleep apnea, but does not have CPAP machine at home.  Stated no changes to his home medication regime since last admission. (23 Mar 2018 02:02)      PAST MEDICAL & SURGICAL HISTORY:  HTN (hypertension)  DM (diabetes mellitus): type 2  Obesity  MARA      MEDICATIONS  (STANDING):  aspirin  chewable 81 milliGRAM(s) Oral daily  atorvastatin 40 milliGRAM(s) Oral at bedtime  carvedilol 6.25 milliGRAM(s) Oral every 12 hours  furosemide   Injectable 80 milliGRAM(s) IV Push two times a day  heparin  Injectable 5000 Unit(s) SubCutaneous every 8 hours  insulin lispro (HumaLOG) corrective regimen sliding scale   SubCutaneous three times a day before meals  insulin lispro (HumaLOG) corrective regimen sliding scale   SubCutaneous at bedtime  nitroglycerin    2% Ointment 1 Inch(s) Transdermal every 24 hours    MEDICATIONS  (PRN):  acetaminophen   Tablet. 650 milliGRAM(s) Oral every 6 hours PRN Mild Pain (1 - 3)      FAMILY HISTORY:  Family history of hypertension in mother  Family history of diabetes mellitus  Family history of early CAD (Sibling)  Family history of colon cancer      SOCIAL HISTORY:    [ x] Non-smoker    [x ] Alcohol-denies    Allergies    No Known Allergies    Intolerances    	    REVIEW OF SYSTEMS:  CONSTITUTIONAL: No fever, weight loss, or fatigue  EYES: No eye pain, visual disturbances, or discharge  ENT:  No difficulty hearing, tinnitus, vertigo; No sinus or throat pain  NECK: No pain or stiffness  RESPIRATORY: No cough, wheezing, chills or hemoptysis; + Shortness of Breath  CARDIOVASCULAR: No chest pain, palpitations, passing out, dizziness, + leg swelling  GASTROINTESTINAL: No abdominal or epigastric pain. No nausea, vomiting, or hematemesis; No diarrhea or constipation. No melena or hematochezia.  GENITOURINARY: No dysuria, frequency, hematuria, or incontinence  NEUROLOGICAL: No headaches, memory loss, loss of strength, numbness, or tremors  SKIN: No itching, burning, rashes, or lesions   LYMPH Nodes: No enlarged glands  ENDOCRINE: No heat or cold intolerance; No hair loss  MUSCULOSKELETAL: No joint pain or swelling; No muscle, back, or extremity pain  PSYCHIATRIC: No depression, anxiety, mood swings, or difficulty sleeping  HEME/LYMPH: No easy bruising, or bleeding gums  ALLERGY AND IMMUNOLOGIC: No hives or eczema	      PHYSICAL EXAM:  T(C): 36.8 (03-23-18 @ 08:07), Max: 37.1 (03-23-18 @ 06:25)  HR: 82 (03-23-18 @ 08:07) (68 - 86)  BP: 146/87 (03-23-18 @ 08:07) (146/87 - 190/120)  RR: 18 (03-23-18 @ 08:07) (11 - 22)  SpO2: 95% (03-23-18 @ 08:07) (83% - 99%)      Appearance: Normal	  HEENT:   Normal oral mucosa, PERRL, EOMI	  Lymphatic: No lymphadenopathy  Cardiovascular: Normal S1 S2, No JVD, No murmurs, +2 edema  Respiratory: Lungs clear to auscultation	  Psychiatry: A & O x 3, Mood & affect appropriate  Gastrointestinal:  Soft, Non-tender, + BS	  Skin: No rashes, No ecchymoses, No cyanosis	  Neurologic: Non-focal  Extremities: Normal range of motion, No clubbing, cyanosis +2 edema  Vascular: Peripheral pulses palpable 2+ bilaterally    	    ECG:  	Normal sinus rhythm  Possible Left atrial enlargement  Right axis deviation  Incomplete right bundle branch block      	  LABS:	 	    CARDIAC MARKERS:  CARDIAC MARKERS ( 23 Mar 2018 03:27 )  0.108 ng/mL / x     / 389 U/L / x     / 3.6 ng/mL  CARDIAC MARKERS ( 22 Mar 2018 22:05 )  0.119 ng/mL / x     / 366 U/L / x     / x                                  15.4   13.8  )-----------( 388      ( 23 Mar 2018 03:27 )             52.8     03-23    139  |  98  |  24<H>  ----------------------------<  152<H>  3.2<L>   |  35<H>  |  1.53<H>    Ca    8.8      23 Mar 2018 03:27  Phos  4.4     03-23  Mg     1.4     03-23    TPro  6.8  /  Alb  2.5<L>  /  TBili  1.4<H>  /  DBili  x   /  AST  30  /  ALT  38  /  AlkPhos  123<H>  03-22    proBNP: Serum Pro-Brain Natriuretic Peptide: 1572 pg/mL (03-22 @ 22:05)    Lipid Profile: Cholesterol 117  LDL 50  HDL 37        TSH: Thyroid Stimulating Hormone, Serum: 3.09 uU/mL (03-23 @ 03:27)      Abdominal sono-no ascites.    OBSERVATIONS:  Mitral Valve: Normal mitral valve. Trace mitral  regurgitation.  Aortic Root: Normal aortic root for patient's BSA (Index  1.1).  Aortic Valve: Probably trileaflet aortic valve is not well  seen. No aortic stenosis. No aortic valve regurgitation  seen.  Left Atrium: Mildly dilated left atrium.  LA volume index =  36 cc/m2.  Left Ventricle: Endocardium not well visualized; grossly  normal left ventricular function. Severe concentric left  ventricular hypertrophy. Grade II diastolic dysfunction.  Right Heart: Normalright atrium. Right ventricular  enlargement with normal RV function (TAPSE 2.2 cm). Normal  tricuspid valve. Trace tricuspid regurgitation. Pulmonic  valve not well seen. Trace pulmonic insufficiency is noted.  Pericardium/PleuraNo pericardial effusion.  Hemodynamic: RA Pressure is 15 mm Hg. RV systolic pressure  is moderately increased at  51 mm Hg.    EXAM:  XR CHEST PA LAT 2V                            PROCEDURE DATE:  03/22/2018          INTERPRETATION:  Chest    Indication: Shortness of breath, peripheral edema    Technique: Frontal and lateral views of the chest are submitted and   compared to a previous study of November 20, 2017.    Findings:    The aorta is uncoiled. The cardiac silhouette is enlarged. There is no   pulmonary vascular congestion. The lungs are clear. The visualized   osseous structures are unremarkable.    Impression:    No significant interval change compared to the previous study of November 20, 2017.  Cardiomegaly.  No acute cardiopulmonary process.

## 2018-03-23 NOTE — H&P ADULT - ASSESSMENT
43yo male from home, lives alone, works as , well known to resident from previous admission PMH morbid obesity, MARA not on CPAP,  DM, HTN, HFpEF (TTE in 11/2017 showed Grade II DD) p/w 4 days of dyspnea on exertion,

## 2018-03-23 NOTE — H&P ADULT - PROBLEM SELECTOR PLAN 6
IMPROVE VTE Individual Risk Assessment    RISK                                                          Points  [] Previous VTE                                           3  [] Thrombophilia                                        2  [] Lower limb paralysis                              2   [] Current Cancer                                       2   [x] Immobilization > 24 hrs                        1  [] ICU/CCU stay > 24 hours                       1  [] Age > 60                                                   1    IMPROVE VTE Score: 1   Will use heparin for DVT PPx as bilateral swollen legs and difficulty ambulating

## 2018-03-23 NOTE — CONSULT NOTE ADULT - PROBLEM SELECTOR RECOMMENDATION 3
HEART score = 5  patient high risk for ASC - typical chest pain, DM, obesity, family history HEART score = 5  patient high risk for ASC - typical chest pain, DM, HTN, obesity, family history  PE unlikely with D-Dimer< 500  - ASA, statin, coreg, nitropaste  - heparin gtt  - trend troponins.  If T2 downtrending, can dc heparin gtt  - cardiology consult in AM

## 2018-03-23 NOTE — CONSULT NOTE ADULT - ATTENDING COMMENTS
41yo PMH morbid obesity, DM, HTN, HFpEF  p/w 4 days of dyspnea on exertion and epigastric/lower chest pressure with exertion likely 2/2 CHF exacerbation and hypertensive urgency possibly 2/2 nonadherence to medication, cannot RO Acute coronary syndrome    Also suspect Obesity hypoventilation syndrome      Assessment    1. Acute on chronic hypoxic and hypercarbic respiratory failure 2/2 -   CHF decompensation   Obesity hypoventilation syndrome    PE less likely given relatively low DDimers     2. Atypical chest pain RO Acute coronary syndrome       3. Hypertensive urgency       Plan   Admit Telemetry floor   ASA   Trend Troponins and if uptrending start anticoagulation   Repeat 2d echo   Supplemental oxygen as needed to keep O2 sats >92%   Diuresis   HOB at 30   Stress ulcer prophylaxis and DVT prophylaxis   Bowel regimen   Cardiology evaluation - may need stress test when stable   Glycemic control keep FSBS 140 -180   BP control - slow reduction. May use oral anti HTN now given drop in SBP to 10's

## 2018-03-23 NOTE — ED PEDIATRIC NURSE REASSESSMENT NOTE - NS ED NURSE REASSESS COMMENT FT2
Pt received from KYA zendejas. Pt is aox3. Pt denies any shortness of breath right now but states that he has shortness of breath when he ambulates. Pt not in any acute distress.

## 2018-03-23 NOTE — CONSULT NOTE ADULT - PROBLEM SELECTOR RECOMMENDATION 2
-> 150 after labetalol 20mg IV  goal  during 1st 24 hours, no greater than 25% reduction in BP  - restart coreg at home dose (6.25 q12), 1" nitropaste added  - telemetry monitoring  -> 150 after labetalol 20mg IV  goal  during 1st 24 hours, no greater than 25% reduction in BP  - restart coreg at home dose (6.25 q12), 1" nitropaste added  - ACEi held 2/2 Mani  - telemetry monitoring

## 2018-03-23 NOTE — H&P ADULT - PROBLEM SELECTOR PLAN 2
- -> 150 after labetalol 20mg IV  goal  during 1st 24 hours, no greater than 25% reduction in BP  - restart coreg at home dose (6.25 q12)  - ACEI held 2/2 RALF, Lasix BID 40mg IV also for fluid overload  - telemetry monitoring.

## 2018-03-23 NOTE — H&P ADULT - FAMILY HISTORY
Family history of colon cancer     Family history of diabetes mellitus     Family history of hypertension in mother     Sibling  Still living? Unknown  Family history of early CAD, Age at diagnosis: Age Unknown

## 2018-03-23 NOTE — CONSULT NOTE ADULT - ASSESSMENT
42 yr old male from home, lives alone, works as , well known to resident from previous admission PMH morbid obesity, MARA not on CPAP,  DM, HTN, HFpEF (TTE in 11/2017 showed Grade II DD) p/w 4 days of dyspnea on exertion, 4 days of non radiating epigastric/lower chest pressure with exertion.  1.Tele monitoring.  2.Diuretics as per renal.  3.HTN-coreg, d/c ntg paste, add imdur 30mg qd and hydralazine 25mg tid.  4.CRI-Check spep,hepatitis,andriy.  5.MARA-will need cpap.  6.DM-Insulin.  7.ASA,statin.  8.GI and DVT prophylaxis.
RALF due to hypertension and CHF.  CHF due to pulmonary hypertension diastolic failure and r/o nephrotic syndrome    Hypoalbuminemia etiology ? nephrotic syndrome and or due to chronic disease.    Hypertension essential , associated with diabetes and obesity and CHF.  Started diuretics and Carvedilol with improved BP    Suggest to increased Lasix to 80 mg BID.  Obtain ABG as his sodium bicarbonate is elevated. Need to see if there is a need for other type of diuretics.    Check UA for protein and Microalbumin creatinine ratio.  Follow urine lytes and osmolality follow response to diuretics.      Daily weight.  Follow BNP level  Later may benefit from Amlodipine for LVH
41yo male from home, lives alone, works as , PMH morbid obesity, DM, HTN, HFpEF  p/w 4 days of dyspnea on exertion and epigastric/lower chest pressure with exertion likely 2/2 CHF exacerbation and hypertensive urgency possibly 2/2 nonadherence to medication

## 2018-03-23 NOTE — CONSULT NOTE ADULT - SUBJECTIVE AND OBJECTIVE BOX
OLIVIA JIN    HPI:  43yo male from home, lives alone, works as , PMH morbid obesity, DM, HTN, HFpEF (TTE in 11/2017 showed Grade II DD) p/w 4 days of dyspnea on exertion, 4 days of nonradiating epigastric pressure with exertion      REVIEW OF SYSTEMS:  CONSTITUTIONAL: No fever, weight loss, or fatigue  EYES: No eye pain, visual disturbances, or discharge  ENMT:  No difficulty hearing, tinnitus, vertigo; No sinus or throat pain  NECK: No pain or stiffness  BREASTS: No pain, masses, or nipple discharge  RESPIRATORY: No cough, wheezing, chills or hemoptysis; No shortness of breath  CARDIOVASCULAR: No chest pain, palpitations, dizziness, or leg swelling  GASTROINTESTINAL: No abdominal or epigastric pain. No nausea, vomiting, or hematemesis; No diarrhea or constipation. No melena or hematochezia.  GENITOURINARY: No dysuria, frequency, hematuria, or incontinence  NEUROLOGICAL: No headaches, memory loss, loss of strength, numbness, or tremors  SKIN: No itching, burning, rashes, or lesions   LYMPH NODES: No enlarged glands  ENDOCRINE: No heat or cold intolerance; No hair loss  MUSCULOSKELETAL: No joint pain or swelling; No muscle, back, or extremity pain  PSYCHIATRIC: No depression, anxiety, mood swings, or difficulty sleeping  HEME/LYMPH: No easy bruising, or bleeding gums  ALLERY AND IMMUNOLOGIC: No hives or eczema    T(C): 36.6 (03-22-18 @ 20:43), Max: 36.6 (03-22-18 @ 20:43)  HR: 68 (03-22-18 @ 23:37) (68 - 86)  BP: 151/124 (03-22-18 @ 23:37) (151/124 - 190/120)  RR: 11 (03-22-18 @ 23:06) (11 - 20)  SpO2: 92% (03-22-18 @ 23:09) (83% - 92%)  Wt(kg): --Vital Signs Last 24 Hrs  T(C): 36.6 (22 Mar 2018 20:43), Max: 36.6 (22 Mar 2018 20:43)  T(F): 97.9 (22 Mar 2018 20:43), Max: 97.9 (22 Mar 2018 20:43)  HR: 68 (22 Mar 2018 23:37) (68 - 86)  BP: 151/124 (22 Mar 2018 23:37) (151/124 - 190/120)  BP(mean): --  RR: 11 (22 Mar 2018 23:06) (11 - 20)  SpO2: 92% (22 Mar 2018 23:09) (83% - 92%)    PHYSICAL EXAM:  GENERAL: NAD, well-groomed, well-developed  HEAD:  Atraumatic, Normocephalic  EYES: EOMI, PERRLA, conjunctiva and sclera clear  ENMT: No tonsillar erythema, exudates, or enlargement; Moist mucous membranes, Good dentition, No lesions  NECK: Supple, No JVD, Normal thyroid  NERVOUS SYSTEM:  Alert & Oriented X3, Good concentration; Motor Strength 5/5 B/L upper and lower extremities; DTRs 2+ intact and symmetric  CHEST/LUNG: Clear to percussion bilaterally; No rales, rhonchi, wheezing, or rubs  HEART: Regular rate and rhythm; No murmurs, rubs, or gallops  ABDOMEN: Soft, Nontender, Nondistended; Bowel sounds present  EXTREMITIES:  2+ Peripheral Pulses, No clubbing, cyanosis, or edema  LYMPH: No lymphadenopathy noted  SKIN: No rashes or lesions    Consultant(s) Notes Reviewed:  [x ] YES  [ ] NO  Care Discussed with Consultants/Other Providers [ x] YES  [ ] NO    LABS:                        15.0   13.1  )-----------( 369      ( 22 Mar 2018 22:05 )             50.6     03-22    138  |  99  |  25<H>  ----------------------------<  110<H>  3.5   |  32<H>  |  1.65<H>    Ca    8.6      22 Mar 2018 22:05    TPro  6.8  /  Alb  2.5<L>  /  TBili  1.4<H>  /  DBili  x   /  AST  30  /  ALT  38  /  AlkPhos  123<H>  03-22    PT/INR - ( 22 Mar 2018 22:05 )   PT: 13.9 sec;   INR: 1.27 ratio         PTT - ( 22 Mar 2018 22:05 )  PTT:29.2 sec    CAPILLARY BLOOD GLUCOSE      POCT Blood Glucose.: 110 mg/dL (22 Mar 2018 20:45)      ABG - ( 22 Mar 2018 23:50 )  pH: 7.42  /  pCO2: 55    /  pO2: 64    / HCO3: 35    / Base Excess: 8.8   /  SaO2: 90                    RADIOLOGY & ADDITIONAL TESTS:    Imaging Personally Reviewed:  [ ] YES  [ ] NO    43yo male from home, lives alone, works as , PMH DM, HTN, HFpEF, morbid obesity p/w 4 days of dyspnea on exertion, epigastric pain with exertion, 4 months of 4-pillow orthopnea. bilateral leg swelling. OLIVIA JIN    HPI:  41yo male from home, lives alone, works as , [facility] morbid obesity, DM, HTN, HFpEF (TTE in 11/2017 showed Grade II DD) p/w 4 days of dyspnea on exertion, 4 days of nonradiating epigastric/lower chest pressure with exertion. Patient endorses epigastric/lower chest pain pressure, which is exacerbated with exertion and resolves with rests.  Dyspnea with exertion during past 4 days is also new.  Patient saw PMD yesterday who noted a low grade temp of 99.  Patient was recently admitted to [facility] in 11/2017 for leg edema, diuresed; TTE at that time showed preserved EF with Grade II DD.  Patient states is adherent to medications 80% of time.  ROS + dizziness, 4-pillow orthopnea.  Denies fever, chills, change in vision, headache, nausea/vomiting, diarrhea, dysuria, hematuria, hematochezia.  States leg swelling is stable, denies leg pain.  Had recent polysomnography, diagnosed with sleep apnea, but does not have CPAP machine at home.       REVIEW OF SYSTEMS:  CONSTITUTIONAL: No fever, weight loss, chills. + fatigue  EYES: No eye pain, visual disturbances, or discharge  ENMT:  No sinus or throat pain  NECK: No pain   RESPIRATORY: + nonproductive cough, + SOB  CARDIOVASCULAR: + lower chest pain, orthopnea  GASTROINTESTINAL: + epigastric pain. No nausea, vomiting, or hematemesis; No diarrhea or constipation. No melena or hematochezia.  GENITOURINARY: No dysuria, frequency, hematuria, or incontinence  NEUROLOGICAL: No headaches, memory loss, loss of strength, numbness, or tremors  SKIN: No itching, burning, rashes, or lesions   LYMPH NODES: No enlarged glands  ENDOCRINE: No heat or cold intolerance  MUSCULOSKELETAL: No joint pain or swelling; No muscle, back, or extremity pain  HEME/LYMPH: No easy bruising, or bleeding gums  ALLERY AND IMMUNOLOGIC: No hives or eczema    T(C): 36.6 (03-22-18 @ 20:43), Max: 36.6 (03-22-18 @ 20:43)  HR: 68 (03-22-18 @ 23:37) (68 - 86)  BP: 151/124 (03-22-18 @ 23:37) (151/124 - 190/120)  RR: 11 (03-22-18 @ 23:06) (11 - 20)  SpO2: 92% (03-22-18 @ 23:09) (83% - 92%)  Wt(kg): --Vital Signs Last 24 Hrs  T(C): 36.6 (22 Mar 2018 20:43), Max: 36.6 (22 Mar 2018 20:43)  T(F): 97.9 (22 Mar 2018 20:43), Max: 97.9 (22 Mar 2018 20:43)  HR: 68 (22 Mar 2018 23:37) (68 - 86)  BP: 151/124 (22 Mar 2018 23:37) (151/124 - 190/120)  BP(mean): --  RR: 11 (22 Mar 2018 23:06) (11 - 20)  SpO2: 92% (22 Mar 2018 23:09) (83% - 92%)    PHYSICAL EXAM:  GENERAL: NAD, well-groomed, well-developed, obese  HEAD:  Atraumatic, Normocephalic  EYES: EOMI, PERRLA, conjunctiva and sclera clear  ENMT: No tonsillar erythema, exudates, or enlargement; Moist mucous membranes, No lesions  NECK: Supple, No JVD  NERVOUS SYSTEM:  Alert & Oriented X3, Good concentration; Motor Strength 5/5 B/L upper and lower extremities  CHEST/LUNG: distant breath sounds, no wheezing  HEART: Regular rate and rhythm; No murmurs  ABDOMEN: Soft, Nontender, obese; Bowel sounds present  EXTREMITIES:  + chronic venous stasis changes, no pitting edema  LYMPH: No lymphadenopathy noted  SKIN: acanthosis nigricans on back    Consultant(s) Notes Reviewed:  [x ] YES  [ ] NO  Care Discussed with Consultants/Other Providers [ x] YES  [ ] NO    LABS:                        15.0   13.1  )-----------( 369      ( 22 Mar 2018 22:05 )             50.6     03-22    138  |  99  |  25<H>  ----------------------------<  110<H>  3.5   |  32<H>  |  1.65<H>    Ca    8.6      22 Mar 2018 22:05    TPro  6.8  /  Alb  2.5<L>  /  TBili  1.4<H>  /  DBili  x   /  AST  30  /  ALT  38  /  AlkPhos  123<H>  03-22    PT/INR - ( 22 Mar 2018 22:05 )   PT: 13.9 sec;   INR: 1.27 ratio         PTT - ( 22 Mar 2018 22:05 )  PTT:29.2 sec    CAPILLARY BLOOD GLUCOSE      POCT Blood Glucose.: 110 mg/dL (22 Mar 2018 20:45)      ABG - ( 22 Mar 2018 23:50 )  pH: 7.42  /  pCO2: 55    /  pO2: 64    / HCO3: 35    / Base Excess: 8.8   /  SaO2: 90          RADIOLOGY & ADDITIONAL TESTS:  CXR: mild congestion with cephalization, perihilar fullness  EKG: SR @ 74 bpm, no acute STTW changes, normal axis    Imaging Personally Reviewed:  [x ] YES  [ ] NO OLIVIA JIN    HPI:  43yo male from home, lives alone, works as , PMH morbid obesity, DM, HTN, HFpEF (TTE in 11/2017 showed Grade II DD) p/w 4 days of dyspnea on exertion, 4 days of nonradiating epigastric/lower chest pressure with exertion. Patient endorses epigastric/lower chest pain pressure, which is exacerbated with exertion and resolves with rests.  Dyspnea with exertion during past 4 days is also new.  Patient saw PMD yesterday who noted a low grade temp of 99.  Patient was recently admitted to [facility] in 11/2017 for leg edema, diuresed; TTE at that time showed preserved EF with Grade II DD.  Patient states is adherent to medications 80% of time.  ROS + dizziness, 4-pillow orthopnea.  Denies fever, chills, change in vision, headache, nausea/vomiting, diarrhea, dysuria, hematuria, hematochezia.  States leg swelling is stable, denies leg pain.  Had recent polysomnography, diagnosed with sleep apnea, but does not have CPAP machine at home.     PMH: as above  PSHx: None    Meds:  Metformin 500mg BID  Coreg 6.25mg q12  Lasix 40 mg BID          REVIEW OF SYSTEMS:  CONSTITUTIONAL: No fever, weight loss, chills. + fatigue  EYES: No eye pain, visual disturbances, or discharge  ENMT:  No sinus or throat pain  NECK: No pain   RESPIRATORY: + nonproductive cough, + SOB  CARDIOVASCULAR: + lower chest pain, orthopnea  GASTROINTESTINAL: + epigastric pain. No nausea, vomiting, or hematemesis; No diarrhea or constipation. No melena or hematochezia.  GENITOURINARY: No dysuria, frequency, hematuria, or incontinence  NEUROLOGICAL: No headaches, memory loss, loss of strength, numbness, or tremors  SKIN: No itching, burning, rashes, or lesions   LYMPH NODES: No enlarged glands  ENDOCRINE: No heat or cold intolerance  MUSCULOSKELETAL: No joint pain or swelling; No muscle, back, or extremity pain  HEME/LYMPH: No easy bruising, or bleeding gums  ALLERY AND IMMUNOLOGIC: No hives or eczema    T(C): 36.6 (03-22-18 @ 20:43), Max: 36.6 (03-22-18 @ 20:43)  HR: 68 (03-22-18 @ 23:37) (68 - 86)  BP: 151/124 (03-22-18 @ 23:37) (151/124 - 190/120)  RR: 11 (03-22-18 @ 23:06) (11 - 20)  SpO2: 92% (03-22-18 @ 23:09) (83% - 92%)  Wt(kg): --Vital Signs Last 24 Hrs  T(C): 36.6 (22 Mar 2018 20:43), Max: 36.6 (22 Mar 2018 20:43)  T(F): 97.9 (22 Mar 2018 20:43), Max: 97.9 (22 Mar 2018 20:43)  HR: 68 (22 Mar 2018 23:37) (68 - 86)  BP: 151/124 (22 Mar 2018 23:37) (151/124 - 190/120)  BP(mean): --  RR: 11 (22 Mar 2018 23:06) (11 - 20)  SpO2: 92% (22 Mar 2018 23:09) (83% - 92%)    PHYSICAL EXAM:  GENERAL: NAD, well-groomed, well-developed, obese  HEAD:  Atraumatic, Normocephalic  EYES: EOMI, PERRLA, conjunctiva and sclera clear  ENMT: No tonsillar erythema, exudates, or enlargement; Moist mucous membranes, No lesions  NECK: Supple, No JVD  NERVOUS SYSTEM:  Alert & Oriented X3, Good concentration; Motor Strength 5/5 B/L upper and lower extremities  CHEST/LUNG: distant breath sounds, no wheezing  HEART: Regular rate and rhythm; No murmurs  ABDOMEN: Soft, Nontender, obese; Bowel sounds present  EXTREMITIES:  + chronic venous stasis changes, no pitting edema  LYMPH: No lymphadenopathy noted  SKIN: acanthosis nigricans on back    Consultant(s) Notes Reviewed:  [x ] YES  [ ] NO  Care Discussed with Consultants/Other Providers [ x] YES  [ ] NO    LABS:                        15.0   13.1  )-----------( 369      ( 22 Mar 2018 22:05 )             50.6     03-22    138  |  99  |  25<H>  ----------------------------<  110<H>  3.5   |  32<H>  |  1.65<H>    Ca    8.6      22 Mar 2018 22:05    TPro  6.8  /  Alb  2.5<L>  /  TBili  1.4<H>  /  DBili  x   /  AST  30  /  ALT  38  /  AlkPhos  123<H>  03-22    PT/INR - ( 22 Mar 2018 22:05 )   PT: 13.9 sec;   INR: 1.27 ratio         PTT - ( 22 Mar 2018 22:05 )  PTT:29.2 sec    CAPILLARY BLOOD GLUCOSE      POCT Blood Glucose.: 110 mg/dL (22 Mar 2018 20:45)      ABG - ( 22 Mar 2018 23:50 )  pH: 7.42  /  pCO2: 55    /  pO2: 64    / HCO3: 35    / Base Excess: 8.8   /  SaO2: 90          RADIOLOGY & ADDITIONAL TESTS:  CXR: mild congestion with cephalization, perihilar fullness  EKG: SR @ 74 bpm, no acute STTW changes, normal axis    Imaging Personally Reviewed:  [x ] YES  [ ] NO OLIVIA JIN    HPI:  41yo male from home, lives alone, works as , PMH morbid obesity, DM, HTN, HFpEF (TTE in 11/2017 showed Grade II DD) p/w 4 days of dyspnea on exertion, 4 days of nonradiating epigastric/lower chest pressure with exertion. Patient endorses epigastric/lower chest pain pressure, which is exacerbated with exertion and resolves with rests.  Dyspnea with exertion during past 4 days is also new.  Patient saw PMD yesterday who noted a low grade temp of 99.  Patient was recently admitted to [facility] in 11/2017 for leg edema, diuresed; TTE at that time showed preserved EF with Grade II DD.  Patient states is adherent to medications 80% of time.  ROS + dizziness, 4-pillow orthopnea.  Denies fever, chills, change in vision, headache, nausea/vomiting, diarrhea, dysuria, hematuria, hematochezia.  States leg swelling is stable, denies leg pain.  Had recent polysomnography, diagnosed with sleep apnea, but does not have CPAP machine at home.     PMH: as above  PSHx: None    FHx:   mother: CVA, DM, colon cancer  father: unknown  7 siblings: asthma, breast cancer, DM    Meds:  Metformin 500mg BID  Coreg 6.25mg q12  Lasix 40 mg BID  ASA  enalapril 10mg QD  lipitor 40mg QD          REVIEW OF SYSTEMS:  CONSTITUTIONAL: No fever, weight loss, chills. + fatigue  EYES: No eye pain, visual disturbances, or discharge  ENMT:  No sinus or throat pain  NECK: No pain   RESPIRATORY: + nonproductive cough, + SOB  CARDIOVASCULAR: + lower chest pain, orthopnea  GASTROINTESTINAL: + epigastric pain. No nausea, vomiting, or hematemesis; No diarrhea or constipation. No melena or hematochezia.  GENITOURINARY: No dysuria, frequency, hematuria, or incontinence  NEUROLOGICAL: No headaches, memory loss, loss of strength, numbness, or tremors  SKIN: No itching, burning, rashes, or lesions   LYMPH NODES: No enlarged glands  ENDOCRINE: No heat or cold intolerance  MUSCULOSKELETAL: No joint pain or swelling; No muscle, back, or extremity pain  HEME/LYMPH: No easy bruising, or bleeding gums  ALLERY AND IMMUNOLOGIC: No hives or eczema    T(C): 36.6 (03-22-18 @ 20:43), Max: 36.6 (03-22-18 @ 20:43)  HR: 68 (03-22-18 @ 23:37) (68 - 86)  BP: 151/124 (03-22-18 @ 23:37) (151/124 - 190/120)  RR: 11 (03-22-18 @ 23:06) (11 - 20)  SpO2: 92% (03-22-18 @ 23:09) (83% - 92%)  Wt(kg): --Vital Signs Last 24 Hrs  T(C): 36.6 (22 Mar 2018 20:43), Max: 36.6 (22 Mar 2018 20:43)  T(F): 97.9 (22 Mar 2018 20:43), Max: 97.9 (22 Mar 2018 20:43)  HR: 68 (22 Mar 2018 23:37) (68 - 86)  BP: 151/124 (22 Mar 2018 23:37) (151/124 - 190/120)  BP(mean): --  RR: 11 (22 Mar 2018 23:06) (11 - 20)  SpO2: 92% (22 Mar 2018 23:09) (83% - 92%)    PHYSICAL EXAM:  GENERAL: NAD, well-groomed, well-developed, obese  HEAD:  Atraumatic, Normocephalic  EYES: EOMI, PERRLA, conjunctiva and sclera clear  ENMT: No tonsillar erythema, exudates, or enlargement; Moist mucous membranes, No lesions  NECK: Supple, No JVD  NERVOUS SYSTEM:  Alert & Oriented X3, Good concentration; Motor Strength 5/5 B/L upper and lower extremities  CHEST/LUNG: distant breath sounds, no wheezing  HEART: Regular rate and rhythm; No murmurs  ABDOMEN: Soft, Nontender, obese; Bowel sounds present  EXTREMITIES:  + chronic venous stasis changes, no pitting edema  LYMPH: No lymphadenopathy noted  SKIN: acanthosis nigricans on back    Consultant(s) Notes Reviewed:  [x ] YES  [ ] NO  Care Discussed with Consultants/Other Providers [ x] YES  [ ] NO    LABS:                        15.0   13.1  )-----------( 369      ( 22 Mar 2018 22:05 )             50.6     03-22    138  |  99  |  25<H>  ----------------------------<  110<H>  3.5   |  32<H>  |  1.65<H>    Ca    8.6      22 Mar 2018 22:05    TPro  6.8  /  Alb  2.5<L>  /  TBili  1.4<H>  /  DBili  x   /  AST  30  /  ALT  38  /  AlkPhos  123<H>  03-22    PT/INR - ( 22 Mar 2018 22:05 )   PT: 13.9 sec;   INR: 1.27 ratio         PTT - ( 22 Mar 2018 22:05 )  PTT:29.2 sec    CAPILLARY BLOOD GLUCOSE      POCT Blood Glucose.: 110 mg/dL (22 Mar 2018 20:45)      ABG - ( 22 Mar 2018 23:50 )  pH: 7.42  /  pCO2: 55    /  pO2: 64    / HCO3: 35    / Base Excess: 8.8   /  SaO2: 90          RADIOLOGY & ADDITIONAL TESTS:  CXR: mild congestion with cephalization, perihilar fullness  EKG: SR @ 74 bpm, no acute STTW changes, normal axis    Imaging Personally Reviewed:  [x ] YES  [ ] NO

## 2018-03-23 NOTE — CONSULT NOTE ADULT - SUBJECTIVE AND OBJECTIVE BOX
CC SOB and increased edema in lower extremities    HPI:    42 years old with Hypertension and diabetes for several years. Morbid obsity.  Patient came to the ER for increased SOB in the last few days.  He also gives a history of increased leg edema in the  last few month to thigh areas bialteral and lately he developed abdominal was hardening. No scrotal edema  Admission /120 improved with treatment.  xr chest with no CHF  US of abdomen no ascites    Aortic Root: Normal aortic root for patient's BSA (Index  1.1).  Aortic Valve: Probably trileaflet aortic valve is not well  seen. No aortic stenosis. No aortic valve regurgitation  seen.  Left Atrium: Mildly dilated left atrium.  LA volume index =  36 cc/m2.  Left Ventricle: Endocardium not well visualized; grossly  normal left ventricular function. Severe concentric left  ventricular hypertrophy. Grade II diastolic dysfunction.  Right Heart: Normalright atrium. Right ventricular  enlargement with normal RV function (TAPSE 2.2 cm). Normal  tricuspid valve. Trace tricuspid regurgitation. Pulmonic  valve not well seen. Trace pulmonic insufficiency is noted.  Pericardium/PleuraNo pericardial effusion.  Hemodynamic: RA Pressure is 15 mm Hg. RV systolic pressure  is moderately increased at  51 mm Hg.    Normal mitral valve. Trace mitral regurgitation.  2. Probably trileaflet aortic valve is not well seen. No  aortic stenosis. No aortic valve regurgitation seen.  3. Normal aortic root for patient's BSA (Index 1.1).  4. Mildly dilated left atrium.  LA volume index = 36 cc/m2.  5. Severe concentric left ventricular hypertrophy.  6. Endocardium not well visualized; grossly normal left  ventricular function.  7. Grade II diastolic dysfunction.  8. Right ventricular enlargement with normal RV function  (TAPSE 2.2 cm).  9. RA Pressure is 15 mm Hg.  10. RV systolic pressure is moderately increased at  51 mm  Hg.  11. Normal tricuspid valve. Trace tricuspid regurgitation.  12. Pulmonic valve not well seen. Trace pulmonic  insufficiency is noted.  13. No pericardial effusion.          PAST MEDICAL & SURGICAL HISTORY:  HTN (hypertension)  DM (diabetes mellitus): type 2  Severe LVH and pulmonary hypertension  MARA.  CHF in the past.          Home Medications Reviewed    Hospital Medications:   MEDICATIONS  (STANDING):  aspirin  chewable 81 milliGRAM(s) Oral daily  atorvastatin 40 milliGRAM(s) Oral at bedtime  carvedilol 6.25 milliGRAM(s) Oral every 12 hours  furosemide   Injectable 80 milliGRAM(s) IV Push two times a day  heparin  Injectable 5000 Unit(s) SubCutaneous every 8 hours  insulin lispro (HumaLOG) corrective regimen sliding scale   SubCutaneous three times a day before meals  insulin lispro (HumaLOG) corrective regimen sliding scale   SubCutaneous at bedtime  nitroglycerin    2% Ointment 1 Inch(s) Transdermal every 24 hours  potassium chloride    Tablet ER 40 milliEquivalent(s) Oral every 4 hours    MEDICATIONS  (PRN):  acetaminophen   Tablet. 650 milliGRAM(s) Oral every 6 hours PRN Mild Pain (1 - 3)      Allergies    No Known Allergies    Intolerances    Comprehensive Metabolic Panel (03.22.18 @ 22:05)    Sodium, Serum: 138 mmol/L    Potassium, Serum: 3.5 mmol/L    Chloride, Serum: 99 mmol/L    Carbon Dioxide, Serum: 32 mmol/L    Anion Gap, Serum: 7 mmol/L    Blood Urea Nitrogen, Serum: 25 mg/dL    Creatinine, Serum: 1.65 mg/dL    Glucose, Serum: 110 mg/dL    Calcium, Total Serum: 8.6 mg/dL    Protein Total, Serum: 6.8 g/dL    Albumin, Serum: 2.5 g/dL    Bilirubin Total, Serum: 1.4 mg/dL    Alkaline Phosphatase, Serum: 123 U/L    Aspartate Aminotransferase (AST/SGOT): 30 U/L    Alanine Aminotransferase (ALT/SGPT): 38 U/L DA    eGFR if Non : 50: Interpretative comment    WBC Count: 13.8 K/uL (03.23.18 @ 03:27)                            15.4   13.8  )-----------( 388      ( 23 Mar 2018 03:27 )             52.8     03-23    139  |  98  |  24<H>  ----------------------------<  152<H>  3.2<L>   |  35<H>  |  1.53<H>    Ca    8.8      23 Mar 2018 03:27  Phos  4.4     03-23  Mg     1.4     03-23    TPro  6.8  /  Alb  2.5<L>  /  TBili  1.4<H>  /  DBili  x   /  AST  30  /  ALT  38  /  AlkPhos  123<H>  03-22    PT/INR - ( 22 Mar 2018 22:05 )   PT: 13.9 sec;   INR: 1.27 ratio         PTT - ( 22 Mar 2018 22:05 )  PTT:29.2 sec      ABG - ( 22 Mar 2018 23:50 )  pH: 7.42  /  pCO2: 55    /  pO2: 64    / HCO3: 35    / Base Excess: 8.8   /  SaO2: 90                  RADIOLOGY & ADDITIONAL STUDIES:    SOCIAL HISTORY: Denies ETOh,Smoking,     FAMILY HISTORY:  Family history of hypertension in mother  Family history of diabetes mellitus  Family history of early CAD (Sibling)  Family history of colon cancer      REVIEW OF SYSTEMS:  CONSTITUTIONAL: No malaise, No fatigue, No fevers or chills, well developed, no diaphoresis  EYES/ENT: No visual changes;  No vertigo or throat pain   NECK: No pain or stiffness  RESPIRATORY:  shortness of breath  CARDIOVASCULAR:  edema as above  GASTROINTESTINAL: No abdominal or epigastric pain. No nausea, vomiting, or hematemesis; No diarrhea or constipation. No melena or hematochezia.  GENITOURINARY: No dysuria, frequency, foamy urine, urinary urgency, incontinence or hematuria  NEUROLOGICAL: No numbness or weakness, No tremor  SKIN: No itching, burning, rashes, or lesions   VASCULAR: No claudication  Musculoskeletal: no arthralgia, no myalgia      VITALS:  Vital Signs Last 24 Hrs  T(C): 36.8 (23 Mar 2018 08:07), Max: 37.1 (23 Mar 2018 06:25)  T(F): 98.2 (23 Mar 2018 08:07), Max: 98.8 (23 Mar 2018 06:25)  HR: 82 (23 Mar 2018 08:07) (68 - 86)  BP: 146/87 (23 Mar 2018 08:07) (146/87 - 190/120)  BP(mean): --  RR: 18 (23 Mar 2018 08:07) (11 - 22)  SpO2: 95% (23 Mar 2018 08:07) (83% - 99%)    Height (cm): 170.18 (03-22 @ 20:43)  Weight (kg): 158.8 (03-22 @ 20:43)  BMI (kg/m2): 54.8 (03-22 @ 20:43)  BSA (m2): 2.57 (03-22 @ 20:43)    PHYSICAL EXAM: Morbid obesity  Constitutional: NAD but RR 20.  No JVD.    HEENT: anicteric sclera, oropharynx clear, MMM  Neck: No JVD  Respiratory: good air entrance B/L, no wheezes, rales or rhonchi  Cardiovascular: S1, S2, RRR, no pericardial rub, no murmur  Gastrointestinal: Abdominal wall edema plus 1 and erythema in that area with induration  Pelvis: bladder non-distended, no CVA tenderness  Extremities: Edema below ankles' plus 4 with induration.  Thigh edema plus 2-3.  No Cyanosis.

## 2018-03-24 DIAGNOSIS — G47.33 OBSTRUCTIVE SLEEP APNEA (ADULT) (PEDIATRIC): ICD-10-CM

## 2018-03-24 DIAGNOSIS — J45.909 UNSPECIFIED ASTHMA, UNCOMPLICATED: ICD-10-CM

## 2018-03-24 DIAGNOSIS — R07.9 CHEST PAIN, UNSPECIFIED: ICD-10-CM

## 2018-03-24 DIAGNOSIS — E66.01 MORBID (SEVERE) OBESITY DUE TO EXCESS CALORIES: ICD-10-CM

## 2018-03-24 LAB
ALBUMIN SERPL ELPH-MCNC: 2.6 G/DL — LOW (ref 3.5–5)
ALP SERPL-CCNC: 117 U/L — SIGNIFICANT CHANGE UP (ref 40–120)
ALT FLD-CCNC: 37 U/L DA — SIGNIFICANT CHANGE UP (ref 10–60)
ANION GAP SERPL CALC-SCNC: 8 MMOL/L — SIGNIFICANT CHANGE UP (ref 5–17)
APPEARANCE UR: CLEAR — SIGNIFICANT CHANGE UP
AST SERPL-CCNC: 25 U/L — SIGNIFICANT CHANGE UP (ref 10–40)
BACTERIA # UR AUTO: ABNORMAL /HPF
BASOPHILS # BLD AUTO: 0.1 K/UL — SIGNIFICANT CHANGE UP (ref 0–0.2)
BASOPHILS NFR BLD AUTO: 1 % — SIGNIFICANT CHANGE UP (ref 0–2)
BILIRUB SERPL-MCNC: 1.7 MG/DL — HIGH (ref 0.2–1.2)
BILIRUB UR-MCNC: NEGATIVE — SIGNIFICANT CHANGE UP
BUN SERPL-MCNC: 22 MG/DL — HIGH (ref 7–18)
CALCIUM SERPL-MCNC: 8.9 MG/DL — SIGNIFICANT CHANGE UP (ref 8.4–10.5)
CHLORIDE SERPL-SCNC: 94 MMOL/L — LOW (ref 96–108)
CO2 SERPL-SCNC: 37 MMOL/L — HIGH (ref 22–31)
COLOR SPEC: YELLOW — SIGNIFICANT CHANGE UP
CREAT SERPL-MCNC: 1.4 MG/DL — HIGH (ref 0.5–1.3)
DIFF PNL FLD: NEGATIVE — SIGNIFICANT CHANGE UP
EOSINOPHIL # BLD AUTO: 0.3 K/UL — SIGNIFICANT CHANGE UP (ref 0–0.5)
EOSINOPHIL NFR BLD AUTO: 2.5 % — SIGNIFICANT CHANGE UP (ref 0–6)
EPI CELLS # UR: SIGNIFICANT CHANGE UP /HPF
GLUCOSE BLDC GLUCOMTR-MCNC: 120 MG/DL — HIGH (ref 70–99)
GLUCOSE BLDC GLUCOMTR-MCNC: 167 MG/DL — HIGH (ref 70–99)
GLUCOSE BLDC GLUCOMTR-MCNC: 83 MG/DL — SIGNIFICANT CHANGE UP (ref 70–99)
GLUCOSE BLDC GLUCOMTR-MCNC: 98 MG/DL — SIGNIFICANT CHANGE UP (ref 70–99)
GLUCOSE SERPL-MCNC: 155 MG/DL — HIGH (ref 70–99)
GLUCOSE UR QL: NEGATIVE — SIGNIFICANT CHANGE UP
HAV IGM SER-ACNC: SIGNIFICANT CHANGE UP
HBV CORE IGM SER-ACNC: SIGNIFICANT CHANGE UP
HBV SURFACE AG SER-ACNC: SIGNIFICANT CHANGE UP
HCT VFR BLD CALC: 53 % — HIGH (ref 39–50)
HCV AB S/CO SERPL IA: 0.12 S/CO — SIGNIFICANT CHANGE UP
HCV AB SERPL-IMP: SIGNIFICANT CHANGE UP
HGB BLD-MCNC: 15.6 G/DL — SIGNIFICANT CHANGE UP (ref 13–17)
KETONES UR-MCNC: NEGATIVE — SIGNIFICANT CHANGE UP
LEUKOCYTE ESTERASE UR-ACNC: NEGATIVE — SIGNIFICANT CHANGE UP
LYMPHOCYTES # BLD AUTO: 1.3 K/UL — SIGNIFICANT CHANGE UP (ref 1–3.3)
LYMPHOCYTES # BLD AUTO: 11.1 % — LOW (ref 13–44)
MAGNESIUM SERPL-MCNC: 1.9 MG/DL — SIGNIFICANT CHANGE UP (ref 1.6–2.6)
MCHC RBC-ENTMCNC: 24.2 PG — LOW (ref 27–34)
MCHC RBC-ENTMCNC: 29.3 GM/DL — LOW (ref 32–36)
MCV RBC AUTO: 82.5 FL — SIGNIFICANT CHANGE UP (ref 80–100)
MONOCYTES # BLD AUTO: 0.7 K/UL — SIGNIFICANT CHANGE UP (ref 0–0.9)
MONOCYTES NFR BLD AUTO: 6.2 % — SIGNIFICANT CHANGE UP (ref 2–14)
NEUTROPHILS # BLD AUTO: 9.1 K/UL — HIGH (ref 1.8–7.4)
NEUTROPHILS NFR BLD AUTO: 79.2 % — HIGH (ref 43–77)
NITRITE UR-MCNC: NEGATIVE — SIGNIFICANT CHANGE UP
OSMOLALITY UR: 442 MOS/KG — SIGNIFICANT CHANGE UP (ref 50–1200)
PH UR: 7 — SIGNIFICANT CHANGE UP (ref 5–8)
PLATELET # BLD AUTO: 382 K/UL — SIGNIFICANT CHANGE UP (ref 150–400)
POTASSIUM SERPL-MCNC: 3.5 MMOL/L — SIGNIFICANT CHANGE UP (ref 3.5–5.3)
POTASSIUM SERPL-SCNC: 3.5 MMOL/L — SIGNIFICANT CHANGE UP (ref 3.5–5.3)
POTASSIUM UR-SCNC: 34 MMOL/L — SIGNIFICANT CHANGE UP (ref 25–125)
PROT SERPL-MCNC: 6.1 G/DL — SIGNIFICANT CHANGE UP (ref 6–8.3)
PROT SERPL-MCNC: 6.1 G/DL — SIGNIFICANT CHANGE UP (ref 6–8.3)
PROT SERPL-MCNC: 6.8 G/DL — SIGNIFICANT CHANGE UP (ref 6–8.3)
PROT UR-MCNC: 100
RBC # BLD: 6.43 M/UL — HIGH (ref 4.2–5.8)
RBC # FLD: 18.5 % — HIGH (ref 10.3–14.5)
RBC CASTS # UR COMP ASSIST: ABNORMAL /HPF (ref 0–2)
SODIUM SERPL-SCNC: 139 MMOL/L — SIGNIFICANT CHANGE UP (ref 135–145)
SODIUM UR-SCNC: 63 MMOL/L — SIGNIFICANT CHANGE UP (ref 40–220)
SP GR SPEC: 1.01 — SIGNIFICANT CHANGE UP (ref 1.01–1.02)
UROBILINOGEN FLD QL: NEGATIVE — SIGNIFICANT CHANGE UP
WBC # BLD: 11.5 K/UL — HIGH (ref 3.8–10.5)
WBC # FLD AUTO: 11.5 K/UL — HIGH (ref 3.8–10.5)
WBC UR QL: SIGNIFICANT CHANGE UP /HPF (ref 0–5)

## 2018-03-24 RX ADMIN — ATORVASTATIN CALCIUM 40 MILLIGRAM(S): 80 TABLET, FILM COATED ORAL at 21:36

## 2018-03-24 RX ADMIN — HEPARIN SODIUM 5000 UNIT(S): 5000 INJECTION INTRAVENOUS; SUBCUTANEOUS at 06:04

## 2018-03-24 RX ADMIN — HEPARIN SODIUM 5000 UNIT(S): 5000 INJECTION INTRAVENOUS; SUBCUTANEOUS at 15:52

## 2018-03-24 RX ADMIN — Medication 81 MILLIGRAM(S): at 12:37

## 2018-03-24 RX ADMIN — HEPARIN SODIUM 5000 UNIT(S): 5000 INJECTION INTRAVENOUS; SUBCUTANEOUS at 21:36

## 2018-03-24 RX ADMIN — CARVEDILOL PHOSPHATE 6.25 MILLIGRAM(S): 80 CAPSULE, EXTENDED RELEASE ORAL at 18:21

## 2018-03-24 RX ADMIN — ISOSORBIDE MONONITRATE 30 MILLIGRAM(S): 60 TABLET, EXTENDED RELEASE ORAL at 12:37

## 2018-03-24 RX ADMIN — Medication 80 MILLIGRAM(S): at 06:04

## 2018-03-24 RX ADMIN — Medication 25 MILLIGRAM(S): at 06:04

## 2018-03-24 RX ADMIN — Medication 25 MILLIGRAM(S): at 15:52

## 2018-03-24 RX ADMIN — Medication 80 MILLIGRAM(S): at 18:21

## 2018-03-24 RX ADMIN — Medication 25 MILLIGRAM(S): at 21:36

## 2018-03-24 RX ADMIN — CARVEDILOL PHOSPHATE 6.25 MILLIGRAM(S): 80 CAPSULE, EXTENDED RELEASE ORAL at 06:04

## 2018-03-24 NOTE — PROGRESS NOTE ADULT - ASSESSMENT
42 yr old male from home, lives alone, works as , well known to resident from previous admission PMH morbid obesity, MARA not on CPAP,  DM, HTN, HFpEF (TTE in 11/2017 showed Grade II DD) p/w 4 days of dyspnea on exertion, 4 days of non radiating epigastric/lower chest pressure with exertion.  1.Tele monitoring.  2.Diuretics as per renal.  3.HTN-coreg, imdur 30mg qd and hydralazine 25mg tid.  4.CRI-Check spep,hepatitis,andriy.  5.MARA-will need cpap.  6.DM-Insulin.  7.ASA,statin.  8.GI and DVT prophylaxis.

## 2018-03-24 NOTE — PROGRESS NOTE ADULT - SUBJECTIVE AND OBJECTIVE BOX
CHIEF COMPLAINT:Patient is a 42y old  Male who presents with a chief complaint of SOB (23 Mar 2018 02:02)    	  REVIEW OF SYSTEMS:  CONSTITUTIONAL: No fever, weight loss, or fatigue  EYES: No eye pain, visual disturbances, or discharge  ENMT:  No difficulty hearing, tinnitus, vertigo; No sinus or throat pain  NECK: No pain or stiffness  RESPIRATORY: No cough, wheezing, chills or hemoptysis; No Shortness of Breath  CARDIOVASCULAR: No chest pain, palpitations, passing out, dizziness, or leg swelling  GASTROINTESTINAL: No abdominal or epigastric pain. No nausea, vomiting, or hematemesis; No diarrhea or constipation. No melena or hematochezia.  GENITOURINARY: No dysuria, frequency, hematuria, or incontinence  NEUROLOGICAL: No headaches, memory loss, loss of strength, numbness, or tremors  SKIN: No itching, burning, rashes, or lesions   LYMPH Nodes: No enlarged glands  ENDOCRINE: No heat or cold intolerance; No hair loss  MUSCULOSKELETAL: No joint pain or swelling; No muscle, back, or extremity pain  PSYCHIATRIC: No depression, anxiety, mood swings, or difficulty sleeping  HEME/LYMPH: No easy bruising, or bleeding gums  ALLERY AND IMMUNOLOGIC: No hives or eczema	    [ ] All others negative	  [ ] Unable to obtain    PHYSICAL EXAM:  T(C): 37 (18 @ 19:45), Max: 37 (18 @ 19:45)  HR: 77 (18 @ 19:45) (69 - 82)  BP: 130/82 (18 @ 19:45) (130/82 - 159/95)  RR: 17 (18 @ 19:45) (16 - 18)  SpO2: 92% (18 @ 19:45) (92% - 98%)  Wt(kg): --  I&O's Summary    23 Mar 2018 07:  -  24 Mar 2018 07:00  --------------------------------------------------------  IN: 300 mL / OUT: 0 mL / NET: 300 mL    24 Mar 2018 07:  -  24 Mar 2018 21:42  --------------------------------------------------------  IN: 250 mL / OUT: 0 mL / NET: 250 mL        Appearance: Morbidly obese	  HEENT:   Normal oral mucosa, PERRL, EOMI	  Lymphatic: No lymphadenopathy  Cardiovascular: Normal S1 S2, No JVD, No murmurs, No edema  Respiratory: Lungs clear to auscultation	  Psychiatry: A & O x 3, Mood & affect appropriate  Gastrointestinal:  Soft, Non-tender, + BS	  Skin: No rashes, No ecchymoses, No cyanosis	  Neurologic: Non-focal  Extremities: Normal range of motion, No clubbing, cyanosis , +2 edema  Vascular: Peripheral pulses palpable 2+ bilaterally    MEDICATIONS  (STANDING):  aspirin  chewable 81 milliGRAM(s) Oral daily  atorvastatin 40 milliGRAM(s) Oral at bedtime  carvedilol 6.25 milliGRAM(s) Oral every 12 hours  furosemide   Injectable 80 milliGRAM(s) IV Push two times a day  heparin  Injectable 5000 Unit(s) SubCutaneous every 8 hours  hydrALAZINE 25 milliGRAM(s) Oral every 8 hours  insulin lispro (HumaLOG) corrective regimen sliding scale   SubCutaneous three times a day before meals  insulin lispro (HumaLOG) corrective regimen sliding scale   SubCutaneous at bedtime  isosorbide   mononitrate ER Tablet (IMDUR) 30 milliGRAM(s) Oral daily      TELEMETRY: 	    ECG:  	  RADIOLOGY:  OTHER: 	  	  CBC Full  -  ( 24 Mar 2018 06:09 )  WBC Count : 11.5 K/uL  Hemoglobin : 15.6 g/dL  Hematocrit : 53.0 %  Platelet Count - Automated : 382 K/uL  Mean Cell Volume : 82.5 fl  Mean Cell Hemoglobin : 24.2 pg  Mean Cell Hemoglobin Concentration : 29.3 gm/dL  Auto Neutrophil # : 9.1 K/uL  Auto Lymphocyte # : 1.3 K/uL  Auto Monocyte # : 0.7 K/uL  Auto Eosinophil # : 0.3 K/uL  Auto Basophil # : 0.1 K/uL  Auto Neutrophil % : 79.2 %  Auto Lymphocyte % : 11.1 %  Auto Monocyte % : 6.2 %  Auto Eosinophil % : 2.5 %  Auto Basophil % : 1.0 %        CARDIAC MARKERS:  CARDIAC MARKERS ( 23 Mar 2018 03:27 )  0.108 ng/mL / x     / 389 U/L / x     / 3.6 ng/mL  CARDIAC MARKERS ( 22 Mar 2018 22:05 )  0.119 ng/mL / x     / 366 U/L / x     / x                                  15.6   11.5  )-----------( 382      ( 24 Mar 2018 06:09 )             53.0       0324    139  |  94<L>  |  22<H>  ----------------------------<  155<H>  3.5   |  37<H>  |  1.40<H>    Ca    8.9      24 Mar 2018 06:09  Phos  4.4       Mg     1.9         TPro  6.1  /  Alb  x   /  TBili  x   /  DBili  x   /  AST  x   /  ALT  x   /  AlkPhos  x   24      PT/INR - ( 22 Mar 2018 22:05 )   PT: 13.9 sec;   INR: 1.27 ratio         PTT - ( 22 Mar 2018 22:05 )  PTT:29.2 sec    Urinalysis Basic - ( 24 Mar 2018 18:56 )    Color: Yellow / Appearance: Clear / S.010 / pH: x  Gluc: x / Ketone: Negative  / Bili: Negative / Urobili: Negative   Blood: x / Protein: 100 / Nitrite: Negative   Leuk Esterase: Negative / RBC: 2-5 /HPF / WBC 3-5 /HPF   Sq Epi: x / Non Sq Epi: Few /HPF / Bacteria: Trace /HPF      proBNP: Serum Pro-Brain Natriuretic Peptide: 1572 pg/mL ( @ 22:05)    Lipid Profile: Cholesterol 117  LDL 50  HDL 37      HgA1c:   TSH: Thyroid Stimulating Hormone, Serum: 3.09 uU/mL ( @ 03:27)    ABG - ( 22 Mar 2018 23:50 )  pH: 7.42  /  pCO2: 55    /  pO2: 64    / HCO3: 35    / Base Excess: 8.8   /  SaO2: 90

## 2018-03-24 NOTE — PROGRESS NOTE ADULT - SUBJECTIVE AND OBJECTIVE BOX
CHIEF COMPLAINT:Patient is a 42y old  Male who presents with a chief complaint of SOB .Pt feeling better.    	  REVIEW OF SYSTEMS:  CONSTITUTIONAL: No fever, weight loss, or fatigue  EYES: No eye pain, visual disturbances, or discharge  ENT:  No difficulty hearing, tinnitus, vertigo; No sinus or throat pain  NECK: No pain or stiffness  RESPIRATORY: No cough, wheezing, chills or hemoptysis; No Shortness of Breath  CARDIOVASCULAR: No chest pain, palpitations, passing out, dizziness, or leg swelling  GASTROINTESTINAL: No abdominal or epigastric pain. No nausea, vomiting, or hematemesis; No diarrhea or constipation. No melena or hematochezia.  GENITOURINARY: No dysuria, frequency, hematuria, or incontinence  NEUROLOGICAL: No headaches, memory loss, loss of strength, numbness, or tremors  SKIN: No itching, burning, rashes, or lesions   LYMPH Nodes: No enlarged glands  ENDOCRINE: No heat or cold intolerance; No hair loss  MUSCULOSKELETAL: No joint pain or swelling; No muscle, back, or extremity pain  PSYCHIATRIC: No depression, anxiety, mood swings, or difficulty sleeping  HEME/LYMPH: No easy bruising, or bleeding gums  ALLERGY AND IMMUNOLOGIC: No hives or eczema	      PHYSICAL EXAM:  T(C): 36.5 (03-24-18 @ 07:29), Max: 36.8 (03-23-18 @ 19:17)  HR: 75 (03-24-18 @ 07:29) (71 - 82)  BP: 133/89 (03-24-18 @ 07:29) (113/63 - 159/95)  RR: 18 (03-24-18 @ 07:29) (18 - 20)  SpO2: 96% (03-24-18 @ 07:29) (95% - 100%)  Wt(kg): --  I&O's Summary    23 Mar 2018 07:01  -  24 Mar 2018 07:00  --------------------------------------------------------  IN: 300 mL / OUT: 0 mL / NET: 300 mL        Appearance: Normal	  HEENT:   Normal oral mucosa, PERRL, EOMI	  Lymphatic: No lymphadenopathy  Cardiovascular: Normal S1 S2, No JVD, No murmurs, +1 edema  Respiratory: Lungs clear to auscultation	  Psychiatry: A & O x 3, Mood & affect appropriate  Gastrointestinal:  Soft, Non-tender, + BS	  Skin: No rashes, No ecchymoses, No cyanosis	  Neurologic: Non-focal  Extremities: Normal range of motion, No clubbing, cyanosis +1 edema  Vascular: Peripheral pulses palpable 2+ bilaterally    MEDICATIONS  (STANDING):  aspirin  chewable 81 milliGRAM(s) Oral daily  atorvastatin 40 milliGRAM(s) Oral at bedtime  carvedilol 6.25 milliGRAM(s) Oral every 12 hours  furosemide   Injectable 80 milliGRAM(s) IV Push two times a day  heparin  Injectable 5000 Unit(s) SubCutaneous every 8 hours  hydrALAZINE 25 milliGRAM(s) Oral every 8 hours  insulin lispro (HumaLOG) corrective regimen sliding scale   SubCutaneous three times a day before meals  insulin lispro (HumaLOG) corrective regimen sliding scale   SubCutaneous at bedtime  isosorbide   mononitrate ER Tablet (IMDUR) 30 milliGRAM(s) Oral daily        ECG:  	nsr    	  LABS:	 	    CARDIAC MARKERS:  CARDIAC MARKERS ( 23 Mar 2018 03:27 )  0.108 ng/mL / x     / 389 U/L / x     / 3.6 ng/mL  CARDIAC MARKERS ( 22 Mar 2018 22:05 )  0.119 ng/mL / x     / 366 U/L / x     / x                            15.6   11.5  )-----------( 382      ( 24 Mar 2018 06:09 )             53.0     03-24    139  |  94<L>  |  22<H>  ----------------------------<  155<H>  3.5   |  37<H>  |  1.40<H>    Ca    8.9      24 Mar 2018 06:09  Phos  4.4     03-23  Mg     1.9     03-24    TPro  6.8  /  Alb  2.6<L>  /  TBili  1.7<H>  /  DBili  x   /  AST  25  /  ALT  37  /  AlkPhos  117  03-24    proBNP: Serum Pro-Brain Natriuretic Peptide: 1572 pg/mL (03-22 @ 22:05)    Lipid Profile: Cholesterol 117  LDL 50  HDL 37        TSH: Thyroid Stimulating Hormone, Serum: 3.09 uU/mL (03-23 @ 03:27)

## 2018-03-24 NOTE — CONSULT NOTE ADULT - SUBJECTIVE AND OBJECTIVE BOX
PULMONARY CONSULT NOTE      OLIVIA JIN  MRN-[id number]    Patient is a 42y old  Male who presents with a chief complaint of SOB (23 Mar 2018 02:02)    History of Present Illness:  Reason for Admission: SOB	  History of Present Illness: 	  41yo male from home, lives alone, works as , well known to resident from previous admission PMH morbid obesity, MARA not on CPAP,  DM, HTN, HFpEF (TTE in 11/2017 showed Grade II DD) p/w 4 days of dyspnea on exertion, 4 days of non radiating epigastric/lower chest pressure with exertion. Patient endorses epigastric/lower chest pain pressure, which is exacerbated with exertion and resolves with rests and also point to the lower abd and stated that pain comes with tearing sensation  and bloating from edema.  Dyspnea with exertion during past 4 days and can not lie flat at night.  Patient saw PMD yesterday who noted a low grade temp of 99.  Patient was recently admitted to [facility] in 11/2017 for leg edema from medication non compliance, diuresed; TTE at that time showed preserved EF with Grade II DD.  Patient states is adherent to medications 80% of time.  ROS + dizziness, 4-pillow orthopnea.  Denies fever, chills, change in vision, headache, nausea/vomiting, diarrhea, dysuria, hematuria, hematochezia.  States leg swelling is stable, denies leg pain.  Had recent polysomnography, diagnosed with sleep apnea, but does not have CPAP machine at home.  Stated no changes to his home medication regime since last admission.            HISTORY OF PRESENT ILLNESS: as above. Has been having sob and Méndez associated with cough and wheezing as well. Had sleep study recently which showed sleep apnea. C-pap titration not yet done. Morbidly obese.    MEDICATIONS  (STANDING):  aspirin  chewable 81 milliGRAM(s) Oral daily  atorvastatin 40 milliGRAM(s) Oral at bedtime  carvedilol 6.25 milliGRAM(s) Oral every 12 hours  furosemide   Injectable 80 milliGRAM(s) IV Push two times a day  heparin  Injectable 5000 Unit(s) SubCutaneous every 8 hours  hydrALAZINE 25 milliGRAM(s) Oral every 8 hours  insulin lispro (HumaLOG) corrective regimen sliding scale   SubCutaneous three times a day before meals  insulin lispro (HumaLOG) corrective regimen sliding scale   SubCutaneous at bedtime  isosorbide   mononitrate ER Tablet (IMDUR) 30 milliGRAM(s) Oral daily      MEDICATIONS  (PRN):  acetaminophen   Tablet. 650 milliGRAM(s) Oral every 6 hours PRN Mild Pain (1 - 3)      Allergies    No Known Allergies    Intolerances        PAST MEDICAL & SURGICAL HISTORY:  HTN (hypertension)  DM (diabetes mellitus): type 2  No significant past surgical history      FAMILY HISTORY:  Family history of hypertension in mother  Family history of diabetes mellitus  Family history of early CAD (Sibling)  Family history of colon cancer      SOCIAL HISTORY  Smoking History:     REVIEW OF SYSTEMS:    CONSTITUTIONAL:  No fevers, chills, sweats    HEENT:  Eyes:  No diplopia or blurred vision. ENT:  No earache, sore throat or runny nose.    CARDIOVASCULAR:  No pressure, squeezing, tightness, or heaviness about the chest; no palpitations.    RESPIRATORY:  Per HPI    GASTROINTESTINAL:  No abdominal pain, nausea, vomiting or diarrhea.    GENITOURINARY:  No dysuria, frequency or urgency.    NEUROLOGIC:  No paresthesias, fasciculations, seizures or weakness.    PSYCHIATRIC:  No disorder of thought or mood.    Vital Signs Last 24 Hrs  T(C): 36.6 (24 Mar 2018 11:46), Max: 36.8 (23 Mar 2018 19:17)  T(F): 97.8 (24 Mar 2018 11:46), Max: 98.2 (23 Mar 2018 19:17)  HR: 69 (24 Mar 2018 11:46) (69 - 82)  BP: 134/86 (24 Mar 2018 11:46) (113/63 - 159/95)  BP(mean): --  RR: 16 (24 Mar 2018 11:46) (16 - 20)  SpO2: 98% (24 Mar 2018 11:46) (95% - 100%)  I&O's Detail    23 Mar 2018 07:01  -  24 Mar 2018 07:00  --------------------------------------------------------  IN:    Oral Fluid: 300 mL  Total IN: 300 mL    OUT:  Total OUT: 0 mL    Total NET: 300 mL      24 Mar 2018 07:01  -  24 Mar 2018 13:00  --------------------------------------------------------  IN:    Oral Fluid: 250 mL  Total IN: 250 mL    OUT:  Total OUT: 0 mL    Total NET: 250 mL          PHYSICAL EXAMINATION:    GENERAL: The patient is a well-developed, well-nourished _____in no apparent distress.     HEENT: Head is normocephalic and atraumatic. Extraocular muscles are intact. Mucous membranes are moist.     NECK: Supple.     LUNGS: Clear to auscultation without wheezing, rales, or rhonchi. Respirations unlabored    HEART: Regular rate and rhythm without murmur.    ABDOMEN: Soft, nontender, and nondistended.  No hepatosplenomegaly is noted.    EXTREMITIES: Without any cyanosis, clubbing, rash, lesions or edema.    NEUROLOGIC: Grossly intact.      LABS:                        15.6   11.5  )-----------( 382      ( 24 Mar 2018 06:09 )             53.0     03-24    139  |  94<L>  |  22<H>  ----------------------------<  155<H>  3.5   |  37<H>  |  1.40<H>    Ca    8.9      24 Mar 2018 06:09  Phos  4.4     03-23  Mg     1.9     03-24    TPro  6.1  /  Alb  x   /  TBili  x   /  DBili  x   /  AST  x   /  ALT  x   /  AlkPhos  x   03-24    PT/INR - ( 22 Mar 2018 22:05 )   PT: 13.9 sec;   INR: 1.27 ratio         PTT - ( 22 Mar 2018 22:05 )  PTT:29.2 sec    ABG - ( 22 Mar 2018 23:50 )  pH: 7.42  /  pCO2: 55    /  pO2: 64    / HCO3: 35    / Base Excess: 8.8   /  SaO2: 90                CARDIAC MARKERS ( 23 Mar 2018 03:27 )  0.108 ng/mL / x     / 389 U/L / x     / 3.6 ng/mL  CARDIAC MARKERS ( 22 Mar 2018 22:05 )  0.119 ng/mL / x     / 366 U/L / x     / x            Serum Pro-Brain Natriuretic Peptide: 1572 pg/mL (03-22-18 @ 22:05)          MICROBIOLOGY:    RADIOLOGY & ADDITIONAL STUDIES:    CXR:  < from: Xray Chest 2 Views PA/Lat (03.22.18 @ 22:11) >  Impression:    No significant interval change compared to the previous study of November 20, 2017.  Cardiomegaly.  No acute cardiopulmonary process.    < end of copied text >    Ct scan chest:    ekg;    echo:

## 2018-03-24 NOTE — PROGRESS NOTE ADULT - SUBJECTIVE AND OBJECTIVE BOX
Problem List:    RALF due to cardiorenal syndrome and hypertension.    42 years old with Hypertension and diabetes for several years. Morbid obsity.  Patient came to the ER for increased SOB in the last few days.  He also gives a history of increased leg edema in the  last few month to thigh areas bialteral and lately he developed abdominal was hardening. No scrotal edema  Admission /120 improved with treatment.  xr chest with no CHF  US of abdomen no ascites      PAST MEDICAL & SURGICAL HISTORY:  HTN (hypertension)  DM (diabetes mellitus): type 2  Morbid obesity.  MARA          MEDICATIONS  (STANDING):  aspirin  chewable 81 milliGRAM(s) Oral daily  atorvastatin 40 milliGRAM(s) Oral at bedtime  carvedilol 6.25 milliGRAM(s) Oral every 12 hours  furosemide   Injectable 80 milliGRAM(s) IV Push two times a day  heparin  Injectable 5000 Unit(s) SubCutaneous every 8 hours  hydrALAZINE 25 milliGRAM(s) Oral every 8 hours  insulin lispro (HumaLOG) corrective regimen sliding scale   SubCutaneous three times a day before meals  insulin lispro (HumaLOG) corrective regimen sliding scale   SubCutaneous at bedtime  isosorbide   mononitrate ER Tablet (IMDUR) 30 milliGRAM(s) Oral daily    MEDICATIONS  (PRN):  acetaminophen   Tablet. 650 milliGRAM(s) Oral every 6 hours PRN Mild Pain (1 - 3)                            15.6   11.5  )-----------( 382      ( 24 Mar 2018 06:09 )             53.0     03-24    139  |  94<L>  |  22<H>  ----------------------------<  155<H>  3.5   |  37<H>  |  1.40<H>    Ca    8.9      24 Mar 2018 06:09  Phos  4.4     03-23  Mg     1.9     03-24    Creatinine, Serum: 1.65 mg/dL (03.22.18 @ 22:05)      TPro  6.8  /  Alb  2.6<L>  /  TBili  1.7<H>  /  DBili  x   /  AST  25  /  ALT  37  /  AlkPhos  117  03-24    PT/INR - ( 22 Mar 2018 22:05 )   PT: 13.9 sec;   INR: 1.27 ratio         PTT - ( 22 Mar 2018 22:05 )  PTT:29.2 sec    Blood Gas Profile - Arterial (03.22.18 @ 23:50)    pH, Arterial: 7.42    pCO2, Arterial: 55 mmHg    pO2, Arterial: 64 mmHg    HCO3, Arterial: 35 mmol/L    Base Excess, Arterial: 8.8 mmol/L    Oxygen Saturation, Arterial: 90 %    FIO2, Arterial: 30.0    Blood Gas Comments Arterial: nc  3  L     RT. R.        REVIEW OF SYSTEMS:  General: no fever no chills, no weight loss.  EYES/ENT: No visual changes;  No vertigo, no headache.  NECK: No pain or stiffness  RESPIRATORY: No cough, wheezing, hemoptysis; SOB improved  CARDIOVASCULAR: No chest pain or palpitations. Edema of lower extremities and abdominal wall mild skin softening.  GASTROINTESTINAL: No abdominal or epigastric pain. No nausea, vomiting. No diarrhea or constipation. No melena.  GENITOURINARY: No dysuria, frequency, foamy urine, urinary urgency, incontinence or hematuria  NEUROLOGICAL: No numbness or weakness, no tremor , no dizziness.   Muscle skeletal : no joint pain and no swelling of joints and limbs.          VITALS:  T(F): 97.7 (03-24-18 @ 07:29), Max: 98.2 (03-23-18 @ 19:17)  HR: 75 (03-24-18 @ 07:29)  BP: 133/89 (03-24-18 @ 07:29)  RR: 22 (03-24-18 @ 07:29)  SpO2: 96% (03-24-18 @ 07:29)  Wt(kg): --    03-23 @ 07:01  -  03-24 @ 07:00  --------------------------------------------------------  IN: 300 mL / OUT: 0 mL / NET: 300 mL        PHYSICAL EXAM:  Constitutional: well developed, no diaphoresis, no distress. Morbid obesity.  Neck: No JVD, no carotid bruit, supple, no adenopathy  Respiratory: Good air entrance B/L, no wheezes, rales or rhonchi  Cardiovascular: S1, S2, RRR, no pericardial rub, no murmur  Abdomen: BS+, soft, no tenderness, no bruit  Pelvis: bladder nondistended  Extremities: Indurated dermis with plus 3 edema in tibial areas , plus 1 thigh areas.  Abdominal wall edema plus 1 with erythema

## 2018-03-24 NOTE — CONSULT NOTE ADULT - PROBLEM SELECTOR RECOMMENDATION 4
most likely secondary to MARA  Bronchodilators neb prn  inhaled steroids  Pfts as OP  monitor peak flow
BSL controlled  - hold metformin   - insulin sliding scale  - follow up A1C

## 2018-03-24 NOTE — PROGRESS NOTE ADULT - ASSESSMENT
Doing well with no complaints today. case fully discussed with cardiology and nephrology. Will continue diuretics. Will reevaluate weight on monday. If posible will do stress test in hospital give weight limit of stress test machine.

## 2018-03-24 NOTE — PROGRESS NOTE ADULT - ASSESSMENT
RALF due to CHF AND URGENT HYPERTENSION ON ADMISSION  Cardiorenal syndrome type 1.  Continue IV diuretics.  Daily weight and fluid restriction 1 liter per day.    Hypoalbuminemia r/o nephrotic syndrome await UA.  Liver congestion - increased bilirubin and AP on admission    CO2 retention that explained his elevated serum bicarbonate.  CPAP .    Hypomagnesia to supplement.  Follow K and add daily KCL

## 2018-03-25 ENCOUNTER — TRANSCRIPTION ENCOUNTER (OUTPATIENT)
Age: 43
End: 2018-03-25

## 2018-03-25 VITALS — WEIGHT: 315 LBS

## 2018-03-25 LAB
% ALBUMIN: 47.9 % — SIGNIFICANT CHANGE UP
% ALPHA 1: 5.7 % — SIGNIFICANT CHANGE UP
% ALPHA 2: 11.8 % — SIGNIFICANT CHANGE UP
% BETA: 16.7 % — SIGNIFICANT CHANGE UP
% GAMMA: 17.9 % — SIGNIFICANT CHANGE UP
ALBUMIN SERPL ELPH-MCNC: 2.9 G/DL — LOW (ref 3.6–5.5)
ALBUMIN/GLOB SERPL ELPH: 0.9 RATIO — SIGNIFICANT CHANGE UP
ALPHA1 GLOB SERPL ELPH-MCNC: 0.3 G/DL — SIGNIFICANT CHANGE UP (ref 0.1–0.4)
ALPHA2 GLOB SERPL ELPH-MCNC: 0.7 G/DL — SIGNIFICANT CHANGE UP (ref 0.5–1)
ANA PAT FLD IF-IMP: ABNORMAL
ANA TITR SER: ABNORMAL
ANION GAP SERPL CALC-SCNC: 9 MMOL/L — SIGNIFICANT CHANGE UP (ref 5–17)
B-GLOBULIN SERPL ELPH-MCNC: 1 G/DL — SIGNIFICANT CHANGE UP (ref 0.5–1)
BASOPHILS # BLD AUTO: 0.1 K/UL — SIGNIFICANT CHANGE UP (ref 0–0.2)
BASOPHILS NFR BLD AUTO: 0.6 % — SIGNIFICANT CHANGE UP (ref 0–2)
BUN SERPL-MCNC: 21 MG/DL — HIGH (ref 7–18)
CALCIUM SERPL-MCNC: 8.6 MG/DL — SIGNIFICANT CHANGE UP (ref 8.4–10.5)
CHLORIDE SERPL-SCNC: 93 MMOL/L — LOW (ref 96–108)
CO2 SERPL-SCNC: 39 MMOL/L — HIGH (ref 22–31)
CREAT ?TM UR-MCNC: 98 MG/DL — SIGNIFICANT CHANGE UP
CREAT ?TM UR-MCNC: 98 MG/DL — SIGNIFICANT CHANGE UP
CREAT SERPL-MCNC: 1.29 MG/DL — SIGNIFICANT CHANGE UP (ref 0.5–1.3)
EOSINOPHIL # BLD AUTO: 0.3 K/UL — SIGNIFICANT CHANGE UP (ref 0–0.5)
EOSINOPHIL NFR BLD AUTO: 2.1 % — SIGNIFICANT CHANGE UP (ref 0–6)
GAMMA GLOBULIN: 1.1 G/DL — SIGNIFICANT CHANGE UP (ref 0.6–1.6)
GLUCOSE BLDC GLUCOMTR-MCNC: 151 MG/DL — HIGH (ref 70–99)
GLUCOSE BLDC GLUCOMTR-MCNC: 159 MG/DL — HIGH (ref 70–99)
GLUCOSE SERPL-MCNC: 107 MG/DL — HIGH (ref 70–99)
HCT VFR BLD CALC: 54.6 % — HIGH (ref 39–50)
HGB BLD-MCNC: 15.8 G/DL — SIGNIFICANT CHANGE UP (ref 13–17)
LYMPHOCYTES # BLD AUTO: 1.3 K/UL — SIGNIFICANT CHANGE UP (ref 1–3.3)
LYMPHOCYTES # BLD AUTO: 10.5 % — LOW (ref 13–44)
MCHC RBC-ENTMCNC: 23.9 PG — LOW (ref 27–34)
MCHC RBC-ENTMCNC: 28.9 GM/DL — LOW (ref 32–36)
MCV RBC AUTO: 82.7 FL — SIGNIFICANT CHANGE UP (ref 80–100)
MICROALBUMIN UR-MCNC: 46.5 MG/DL — SIGNIFICANT CHANGE UP
MICROALBUMIN/CREAT UR-RTO: 474 MG/G — HIGH (ref 0–30)
MONOCYTES # BLD AUTO: 0.7 K/UL — SIGNIFICANT CHANGE UP (ref 0–0.9)
MONOCYTES NFR BLD AUTO: 5.9 % — SIGNIFICANT CHANGE UP (ref 2–14)
NEUTROPHILS # BLD AUTO: 9.9 K/UL — HIGH (ref 1.8–7.4)
NEUTROPHILS NFR BLD AUTO: 80.9 % — HIGH (ref 43–77)
PLATELET # BLD AUTO: 399 K/UL — SIGNIFICANT CHANGE UP (ref 150–400)
POTASSIUM SERPL-MCNC: 3.3 MMOL/L — LOW (ref 3.5–5.3)
POTASSIUM SERPL-SCNC: 3.3 MMOL/L — LOW (ref 3.5–5.3)
PROT ?TM UR-MCNC: 74 MG/DL — HIGH (ref 0–12)
PROT PATTERN SERPL ELPH-IMP: SIGNIFICANT CHANGE UP
PROT/CREAT UR-RTO: 0.8 RATIO — HIGH (ref 0–0.2)
RBC # BLD: 6.6 M/UL — HIGH (ref 4.2–5.8)
RBC # FLD: 18.5 % — HIGH (ref 10.3–14.5)
SODIUM SERPL-SCNC: 141 MMOL/L — SIGNIFICANT CHANGE UP (ref 135–145)
WBC # BLD: 12.3 K/UL — HIGH (ref 3.8–10.5)
WBC # FLD AUTO: 12.3 K/UL — HIGH (ref 3.8–10.5)

## 2018-03-25 PROCEDURE — 80061 LIPID PANEL: CPT

## 2018-03-25 PROCEDURE — 82803 BLOOD GASES ANY COMBINATION: CPT

## 2018-03-25 PROCEDURE — 82550 ASSAY OF CK (CPK): CPT

## 2018-03-25 PROCEDURE — 80074 ACUTE HEPATITIS PANEL: CPT

## 2018-03-25 PROCEDURE — 83735 ASSAY OF MAGNESIUM: CPT

## 2018-03-25 PROCEDURE — 80053 COMPREHEN METABOLIC PANEL: CPT

## 2018-03-25 PROCEDURE — 84156 ASSAY OF PROTEIN URINE: CPT

## 2018-03-25 PROCEDURE — 82553 CREATINE MB FRACTION: CPT

## 2018-03-25 PROCEDURE — 80048 BASIC METABOLIC PNL TOTAL CA: CPT

## 2018-03-25 PROCEDURE — 84100 ASSAY OF PHOSPHORUS: CPT

## 2018-03-25 PROCEDURE — 85027 COMPLETE CBC AUTOMATED: CPT

## 2018-03-25 PROCEDURE — 85379 FIBRIN DEGRADATION QUANT: CPT

## 2018-03-25 PROCEDURE — 71046 X-RAY EXAM CHEST 2 VIEWS: CPT

## 2018-03-25 PROCEDURE — 87633 RESP VIRUS 12-25 TARGETS: CPT

## 2018-03-25 PROCEDURE — 87581 M.PNEUMON DNA AMP PROBE: CPT

## 2018-03-25 PROCEDURE — 84155 ASSAY OF PROTEIN SERUM: CPT

## 2018-03-25 PROCEDURE — 84484 ASSAY OF TROPONIN QUANT: CPT

## 2018-03-25 PROCEDURE — 82043 UR ALBUMIN QUANTITATIVE: CPT

## 2018-03-25 PROCEDURE — 82962 GLUCOSE BLOOD TEST: CPT

## 2018-03-25 PROCEDURE — 87798 DETECT AGENT NOS DNA AMP: CPT

## 2018-03-25 PROCEDURE — 83880 ASSAY OF NATRIURETIC PEPTIDE: CPT

## 2018-03-25 PROCEDURE — 76705 ECHO EXAM OF ABDOMEN: CPT

## 2018-03-25 PROCEDURE — 84133 ASSAY OF URINE POTASSIUM: CPT

## 2018-03-25 PROCEDURE — 86038 ANTINUCLEAR ANTIBODIES: CPT

## 2018-03-25 PROCEDURE — 84443 ASSAY THYROID STIM HORMONE: CPT

## 2018-03-25 PROCEDURE — 85610 PROTHROMBIN TIME: CPT

## 2018-03-25 PROCEDURE — 85730 THROMBOPLASTIN TIME PARTIAL: CPT

## 2018-03-25 PROCEDURE — 93005 ELECTROCARDIOGRAM TRACING: CPT

## 2018-03-25 PROCEDURE — 87486 CHLMYD PNEUM DNA AMP PROBE: CPT

## 2018-03-25 PROCEDURE — 81001 URINALYSIS AUTO W/SCOPE: CPT

## 2018-03-25 PROCEDURE — 84300 ASSAY OF URINE SODIUM: CPT

## 2018-03-25 PROCEDURE — 84165 PROTEIN E-PHORESIS SERUM: CPT

## 2018-03-25 PROCEDURE — 83935 ASSAY OF URINE OSMOLALITY: CPT

## 2018-03-25 PROCEDURE — 99291 CRITICAL CARE FIRST HOUR: CPT | Mod: 25

## 2018-03-25 RX ORDER — POTASSIUM CHLORIDE 20 MEQ
20 PACKET (EA) ORAL
Qty: 0 | Refills: 0 | Status: COMPLETED | OUTPATIENT
Start: 2018-03-25 | End: 2018-03-25

## 2018-03-25 RX ORDER — LOSARTAN POTASSIUM 100 MG/1
1 TABLET, FILM COATED ORAL
Qty: 30 | Refills: 0 | OUTPATIENT
Start: 2018-03-25 | End: 2018-04-23

## 2018-03-25 RX ORDER — LOSARTAN POTASSIUM 100 MG/1
25 TABLET, FILM COATED ORAL DAILY
Qty: 0 | Refills: 0 | Status: DISCONTINUED | OUTPATIENT
Start: 2018-03-25 | End: 2018-03-25

## 2018-03-25 RX ADMIN — Medication 80 MILLIGRAM(S): at 05:48

## 2018-03-25 RX ADMIN — Medication 81 MILLIGRAM(S): at 11:37

## 2018-03-25 RX ADMIN — CARVEDILOL PHOSPHATE 6.25 MILLIGRAM(S): 80 CAPSULE, EXTENDED RELEASE ORAL at 05:48

## 2018-03-25 RX ADMIN — HEPARIN SODIUM 5000 UNIT(S): 5000 INJECTION INTRAVENOUS; SUBCUTANEOUS at 05:48

## 2018-03-25 RX ADMIN — Medication 20 MILLIEQUIVALENT(S): at 09:54

## 2018-03-25 RX ADMIN — Medication 25 MILLIGRAM(S): at 05:48

## 2018-03-25 RX ADMIN — Medication 20 MILLIEQUIVALENT(S): at 12:01

## 2018-03-25 NOTE — PROGRESS NOTE ADULT - ASSESSMENT
43yo male from home, lives alone, works as , well known to resident from previous admission PMH morbid obesity, MARA not on CPAP,  DM, HTN, HFpEF (TTE in 11/2017 showed Grade II DD) p/w 4 days of dyspnea on exertion, 4 days of non radiating epigastric/lower chest pressure with exertion. Patient endorses epigastric/lower chest pain pressure, which is exacerbated with exertion and resolves with rests and also point to the lower abd and stated that pain comes with tearing sensation  and bloating from edema.  Dyspnea with exertion during past 4 days and can not lie flat at night.      Patient was recently admitted to [facility] in 11/2017 for leg edema from medication non compliance, diuresed; TTE at that time showed preserved EF with Grade II DD.  Patient states is adherent to medications 80% of time.  ROS + dizziness, 4-pillow orthopnea.  Denies fever, chills, change in vision, headache, nausea/vomiting, diarrhea, dysuria, hematuria, hematochezia.  States leg swelling is stable, denies leg pain.  Had recent polysomnography, diagnosed with sleep apnea, but does not have CPAP machine at home.    Admitted to the floor for CHF exacerbation and Hypertensive Emergency

## 2018-03-25 NOTE — DISCHARGE NOTE ADULT - CARE PLAN
Principal Discharge DX:	Acute on chronic diastolic congestive heart failure  Goal:	Resolution of symptoms  Assessment and plan of treatment:	Patient was admitted to telemetry for acute on chronic heart failure.    2/2 nonadherence to medication and uncontrolled BP  Bedside ultrasound without B lines or effusions.   BNP ~ 1500, CXR with mild congestion.    EKG without acute ischemic changes  No need for repeat TTE  Patient was diuresed with iv lasix ,was on 80 iv bid dosing  , however without resolution of symptoms of heart failure , wanted to leave AMA.  Advised to follow up with PMD in a1 week.  stress test and Sleep study as outpatient.  Secondary Diagnosis:	RALF (acute kidney injury)  Assessment and plan of treatment:	Improved with diuresis.  Secondary Diagnosis:	Hypertensive emergency  Assessment and plan of treatment:	Resolved  Your blood pressure was well-controlled during your admission. Continue with your current regimen of anti-hypertensive medications as mentioned in your discharge summary and ensure that you follow up with your primary care provider within 1 week  of discharge for further recommendations and monitoring.  Secondary Diagnosis:	DM (diabetes mellitus)  Assessment and plan of treatment:	Continue with home medications.  Secondary Diagnosis:	Asthma

## 2018-03-25 NOTE — PROGRESS NOTE ADULT - SUBJECTIVE AND OBJECTIVE BOX
==================PGY 1 Note===================   Discussed with supervising resident and primary attending    ================CHIEF COMPLAINT===============  Patient is a 42y old  Male who presents with a chief complaint of SOB (23 Mar 2018 02:02)        =========INTERVAL HPI/OVERNIGHT EVENTS=========  Offers no new complaints      ============CURRENT MEDICATIONS===============    MEDICATIONS  (STANDING):  aspirin  chewable 81 milliGRAM(s) Oral daily  atorvastatin 40 milliGRAM(s) Oral at bedtime  carvedilol 6.25 milliGRAM(s) Oral every 12 hours  furosemide   Injectable 80 milliGRAM(s) IV Push two times a day  heparin  Injectable 5000 Unit(s) SubCutaneous every 8 hours  hydrALAZINE 25 milliGRAM(s) Oral every 8 hours  insulin lispro (HumaLOG) corrective regimen sliding scale   SubCutaneous three times a day before meals  insulin lispro (HumaLOG) corrective regimen sliding scale   SubCutaneous at bedtime  isosorbide   mononitrate ER Tablet (IMDUR) 30 milliGRAM(s) Oral daily    MEDICATIONS  (PRN):  acetaminophen   Tablet. 650 milliGRAM(s) Oral every 6 hours PRN Mild Pain (1 - 3)        ============REVIEW OF SYSTEMS==================    CONSTITUTIONAL: No fever  EYES: no acute visual disturbances  NECK: No pain or stiffness  RESPIRATORY: No cough; No shortness of breath  CARDIOVASCULAR: No chest pain, no palpitations  GASTROINTESTINAL: No pain. No nausea or vomiting; No diarrhea   NEUROLOGICAL: No headache or numbness, no tremors  MUSCULOSKELETAL: No joint pain, no muscle pain  GENITOURINARY: no dysuria, no frequency, no hesitancy  PSYCHIATRY: no depression , no anxiety  ALL OTHER  ROS negative      ================VITALS SIGNS=====================  Vital Signs Last 24 Hrs  T(C): 36.6 (24 Mar 2018 23:19), Max: 37 (24 Mar 2018 19:45)  T(F): 97.8 (24 Mar 2018 23:19), Max: 98.6 (24 Mar 2018 19:45)  HR: 83 (24 Mar 2018 23:19) (69 - 83)  BP: 151/84 (24 Mar 2018 23:19) (130/82 - 151/84)  BP(mean): --  RR: 17 (24 Mar 2018 23:19) (16 - 18)  SpO2: 95% (24 Mar 2018 23:19) (92% - 98%)    ===============PHYSICAL EXAM====================    GENERAL: NAD  HEENT: Normocephalic;  conjunctivae and sclerae clear; moist mucous membranes;   NECK : supple  CHEST/LUNG: Clear to auscultation bilaterally with good air entry   HEART: S1 S2  regular; no murmurs, gallops or rubs  ABDOMEN: Soft, Nontender, Nondistended; Bowel sounds present  EXTREMITIES: no cyanosis; no edema; no calf tenderness  SKIN: warm and dry; no rash  NERVOUS SYSTEM:  Awake and alert; Oriented  to place, person and time    ==============LABORATORIES======================  LABS:                        15.6   11.5  )-----------( 382      ( 24 Mar 2018 06:09 )             53.0     24    139  |  94<L>  |  22<H>  ----------------------------<  155<H>  3.5   |  37<H>  |  1.40<H>    Ca    8.9      24 Mar 2018 06:09  Phos  4.4     -  Mg     1.9         TPro  6.1  /  Alb  x   /  TBili  x   /  DBili  x   /  AST  x   /  ALT  x   /  AlkPhos  x         Urinalysis Basic - ( 24 Mar 2018 18:56 )    Color: Yellow / Appearance: Clear / S.010 / pH: x  Gluc: x / Ketone: Negative  / Bili: Negative / Urobili: Negative   Blood: x / Protein: 100 / Nitrite: Negative   Leuk Esterase: Negative / RBC: 2-5 /HPF / WBC 3-5 /HPF   Sq Epi: x / Non Sq Epi: Few /HPF / Bacteria: Trace /HPF      CAPILLARY BLOOD GLUCOSE      POCT Blood Glucose.: 98 mg/dL (24 Mar 2018 21:16)  POCT Blood Glucose.: 83 mg/dL (24 Mar 2018 16:29)  POCT Blood Glucose.: 167 mg/dL (24 Mar 2018 11:45)  POCT Blood Glucose.: 120 mg/dL (24 Mar 2018 07:59)      =============INPUTS/OUPUTS=====================    18 @ 07:  -  18 @ 07:00  --------------------------------------------------------  IN: 300 mL / OUT: 0 mL / NET: 300 mL    18 @ 07:01  18 @ 03:18  --------------------------------------------------------  IN: 250 mL / OUT: 0 mL / NET: 250 mL          RADIOLOGY & ADDITIONAL TESTS:    Imaging Personally Reviewed:  YES    Consultant(s) Notes Reviewed:   YES    Care Discussed with Consultants : YES    Plan of care was discussed with patient  and /or primary care giver; all questions and concerns were addressed and care was aligned with patient's wishes. Time was allowed for questions that were answered to the best of my abilities

## 2018-03-25 NOTE — DISCHARGE NOTE ADULT - HOSPITAL COURSE
41yo male from home, lives alone, works as , well known to resident from previous admission PMH morbid obesity, MARA not on CPAP,  DM, HTN, HFpEF (TTE in 11/2017 showed Grade II DD) p/w 4 days of dyspnea on exertion, 4 days of non radiating epigastric/lower chest pressure with exertion. Patient endorses epigastric/lower chest pain pressure, which is exacerbated with exertion and resolves with rests and also point to the lower abd and stated that pain comes with tearing sensation  and bloating from edema.  Dyspnea with exertion during past 4 days and can not lie flat at night.  Patient saw PMD yesterday who noted a low grade temp of 99.  Patient was recently admitted to [facility] in 11/2017 for leg edema from medication non compliance, diuresed; TTE at that time showed preserved EF with Grade II DD.  Patient states is adherent to medications 80% of time.  ROS + dizziness, 4-pillow orthopnea.  Denies fever, chills, change in vision, headache, nausea/vomiting, diarrhea, dysuria, hematuria, hematochezia.  States leg swelling is stable, denies leg pain.  Had recent polysomnography, diagnosed with sleep apnea, but does not have CPAP machine at home.  Stated no changes to his home medication regime since last admission.    Patient was admitted to telemetry for acute on chronic heart failure.    2/2 nonadherence to medication and uncontrolled BP  Bedside ultrasound without B lines or effusions.   BNP ~ 1500, CXR with mild congestion.    EKG without acute ischemic changes  No need for repeat TTE  Patient could not get stress test here due to weight.   Patient was diuresed with iv lasix , however without resolution of symptoms of heart failure , wanted to leave AMA.    HTN urgency and RALF  improved since admission .  Was started on Cozaar on day of discharge and enalapril , from home was held on admission.  Will restart all home meds on discharge as per patient's wishes.   Hss for diabetes.  dvt ppx was given .    After explaining the risks and benefits , patient still wanted to leave against medical advise. Dr. Boyer informed . 41yo male from home, lives alone, works as , well known to resident from previous admission PMH morbid obesity, MARA not on CPAP,  DM, HTN, HFpEF (TTE in 11/2017 showed Grade II DD) p/w 4 days of dyspnea on exertion, 4 days of non radiating epigastric/lower chest pressure with exertion. Patient endorses epigastric/lower chest pain pressure, which is exacerbated with exertion and resolves with rests and also point to the lower abd and stated that pain comes with tearing sensation  and bloating from edema.  Dyspnea with exertion during past 4 days and can not lie flat at night.  Patient saw PMD yesterday who noted a low grade temp of 99.  Patient was recently admitted to [facility] in 11/2017 for leg edema from medication non compliance, diuresed; TTE at that time showed preserved EF with Grade II DD.  Patient states is adherent to medications 80% of time.  ROS + dizziness, 4-pillow orthopnea.  Denies fever, chills, change in vision, headache, nausea/vomiting, diarrhea, dysuria, hematuria, hematochezia.  States leg swelling is stable, denies leg pain.  Had recent polysomnography, diagnosed with sleep apnea, but does not have CPAP machine at home.  Stated no changes to his home medication regime since last admission.    Patient was admitted to telemetry for acute on chronic heart failure.    2/2 nonadherence to medication and uncontrolled BP  Bedside ultrasound without B lines or effusions.   BNP ~ 1500, CXR with mild congestion.    EKG without acute ischemic changes  No need for repeat TTE  Patient could not get stress test here due to weight.   Patient was diuresed with iv lasix , however without resolution of symptoms of heart failure , wanted to leave AMA.    HTN urgency and RALF  improved since admission .  Was started on Cozaar , will continue on discharge.   Hss for diabetes.  dvt ppx was given .    After explaining the risks and benefits , patient still wanted to leave against medical advise. Dr. Boyer informed .

## 2018-03-25 NOTE — PROGRESS NOTE ADULT - PROBLEM SELECTOR PLAN 2
- -> 150 after labetalol 20mg IV  Changed to oral medications  Patient BP WNL   Currently on Hydralazine, Imdur and Coreg   ACEI held 2/2 RALF

## 2018-03-25 NOTE — PROGRESS NOTE ADULT - ASSESSMENT
42 yr old male from home, lives alone, works as , well known to resident from previous admission PMH morbid obesity, MARA not on CPAP,  DM, HTN, HFpEF (TTE in 11/2017 showed Grade II DD) p/w 4 days of dyspnea on exertion, 4 days of non radiating epigastric/lower chest pressure with exertion.  1.Tele monitoring.  2.Diuretics as per renal.  3.HTN-coreg, d/c imdur and hydralazine, start cozaar 25mg qd  4.CRI-Check spep.  5.MARA-will need cpap.  6.DM-Insulin.  7.ASA,statin.  8.GI and DVT prophylaxis.  9.Stress test as outpatient, pt over weight limit of stress machine.

## 2018-03-25 NOTE — PROGRESS NOTE ADULT - PROBLEM SELECTOR PLAN 6
IMPROVE VTE Individual Risk Assessment    RISK                                                          Points  [] Previous VTE                                           3  [] Thrombophilia                                        2  [] Lower limb paralysis                              2   [] Current Cancer                                       2   [x] Immobilization > 24 hrs                        1  [] ICU/CCU stay > 24 hours                       1  [] Age > 60                                                   1    IMPROVE VTE Score: 1     Heparin S/C

## 2018-03-25 NOTE — PROGRESS NOTE ADULT - PROBLEM SELECTOR PLAN 5
BSL controlled  Hold metformin   Insulin sliding scale  HbA1C Pending Still Received by performing department on 3/25/18

## 2018-03-25 NOTE — PROGRESS NOTE ADULT - PROBLEM SELECTOR PLAN 1
CHF exacerbation possibly 2/2 nonadherence to medication and uncontrolled BP  Bedside ultrasound without B lines or effusions.   BNP ~ 1500, CXR with mild congestion.    EKG without acute ischemic changes  No need for repeat TTE  C/W IV Lasix  Cardiology consult Dr Hoffmann

## 2018-03-25 NOTE — DISCHARGE NOTE ADULT - MEDICATION SUMMARY - MEDICATIONS TO TAKE
I will START or STAY ON the medications listed below when I get home from the hospital:    aspirin 81 mg oral tablet, chewable  -- 1 tab(s) by mouth once a day  -- Indication: For Cardioprotective    enalapril 10 mg oral tablet  -- 1 tab(s) by mouth once a day  -- Indication: For HTN (hypertension)    metFORMIN 500 mg oral tablet  -- 1 tab(s) by mouth 2 times a day   -- Check with your doctor before becoming pregnant.  Do not drink alcoholic beverages when taking this medication.  It is very important that you take or use this exactly as directed.  Do not skip doses or discontinue unless directed by your doctor.  Obtain medical advice before taking any non-prescription drugs as some may affect the action of this medication.  Take with food or milk.    -- Indication: For Diabetes    atorvastatin 40 mg oral tablet  -- 1 tab(s) by mouth once a day (at bedtime)  -- Indication: For HLD    carvedilol 6.25 mg oral tablet  -- 1 tab(s) by mouth every 12 hours  -- Indication: For Heart failure     Lasix 40 mg oral tablet  -- 1 tab(s) by mouth 2 times a day  -- Indication: For ChF I will START or STAY ON the medications listed below when I get home from the hospital:    aspirin 81 mg oral tablet, chewable  -- 1 tab(s) by mouth once a day  -- Indication: For Cardioprotective    losartan 25 mg oral tablet  -- 1 tab(s) by mouth once a day  -- Indication: For HTN (hypertension)    metFORMIN 500 mg oral tablet  -- 1 tab(s) by mouth 2 times a day   -- Check with your doctor before becoming pregnant.  Do not drink alcoholic beverages when taking this medication.  It is very important that you take or use this exactly as directed.  Do not skip doses or discontinue unless directed by your doctor.  Obtain medical advice before taking any non-prescription drugs as some may affect the action of this medication.  Take with food or milk.    -- Indication: For Diabetes    atorvastatin 40 mg oral tablet  -- 1 tab(s) by mouth once a day (at bedtime)  -- Indication: For HLD    carvedilol 6.25 mg oral tablet  -- 1 tab(s) by mouth every 12 hours  -- Indication: For Heart failure     Lasix 40 mg oral tablet  -- 1 tab(s) by mouth 2 times a day  -- Indication: For ChF

## 2018-03-25 NOTE — DISCHARGE NOTE ADULT - PATIENT PORTAL LINK FT
You can access the [facility], offered by [facility], by registering with the following website: [email or web address]

## 2018-03-25 NOTE — PROGRESS NOTE ADULT - PROBLEM SELECTOR PLAN 4
Likely from acute on chronic CHF exacerbation and/or THN emergency and  medication non compliance  Slowly improving after management of CHF  Cardiorenal syndrome  Continue IV diuretics  Nephro Dr Porter as per attending

## 2018-03-25 NOTE — DISCHARGE NOTE ADULT - PLAN OF CARE
Resolution of symptoms Patient was admitted to telemetry for acute on chronic heart failure.    2/2 nonadherence to medication and uncontrolled BP  Bedside ultrasound without B lines or effusions.   BNP ~ 1500, CXR with mild congestion.    EKG without acute ischemic changes  No need for repeat TTE  Patient was diuresed with iv lasix ,was on 80 iv bid dosing  , however without resolution of symptoms of heart failure , wanted to leave AMA.  Advised to follow up with PMD in a1 week.  stress test and Sleep study as outpatient. Improved with diuresis. Resolved  Your blood pressure was well-controlled during your admission. Continue with your current regimen of anti-hypertensive medications as mentioned in your discharge summary and ensure that you follow up with your primary care provider within 1 week  of discharge for further recommendations and monitoring. Continue with home medications.

## 2018-03-25 NOTE — PROGRESS NOTE ADULT - SUBJECTIVE AND OBJECTIVE BOX
CHIEF COMPLAINT:Patient is a 42y old  Male who presents with a chief complaint of SOB. Pt feeling better.    	  REVIEW OF SYSTEMS:  CONSTITUTIONAL: No fever, weight loss, or fatigue  EYES: No eye pain, visual disturbances, or discharge  ENT:  No difficulty hearing, tinnitus, vertigo; No sinus or throat pain  NECK: No pain or stiffness  RESPIRATORY: No cough, wheezing, chills or hemoptysis; No Shortness of Breath  CARDIOVASCULAR: No chest pain, palpitations, passing out, dizziness, or leg swelling  GASTROINTESTINAL: No abdominal or epigastric pain. No nausea, vomiting, or hematemesis; No diarrhea or constipation. No melena or hematochezia.  GENITOURINARY: No dysuria, frequency, hematuria, or incontinence  NEUROLOGICAL: No headaches, memory loss, loss of strength, numbness, or tremors  SKIN: No itching, burning, rashes, or lesions   LYMPH Nodes: No enlarged glands  ENDOCRINE: No heat or cold intolerance; No hair loss  MUSCULOSKELETAL: No joint pain or swelling; No muscle, back, or extremity pain  PSYCHIATRIC: No depression, anxiety, mood swings, or difficulty sleeping  HEME/LYMPH: No easy bruising, or bleeding gums  ALLERGY AND IMMUNOLOGIC: No hives or eczema	      PHYSICAL EXAM:  T(C): 36.6 (03-25-18 @ 07:30), Max: 37 (03-24-18 @ 19:45)  HR: 70 (03-25-18 @ 07:30) (69 - 83)  BP: 125/66 (03-25-18 @ 07:30) (125/66 - 151/84)  RR: 17 (03-25-18 @ 07:30) (16 - 20)  SpO2: 95% (03-25-18 @ 07:30) (92% - 98%)    I&O's Summary    24 Mar 2018 07:01  -  25 Mar 2018 07:00  --------------------------------------------------------  IN: 250 mL / OUT: 0 mL / NET: 250 mL        Appearance: Normal	  HEENT:   Normal oral mucosa, PERRL, EOMI	  Lymphatic: No lymphadenopathy  Cardiovascular: Normal S1 S2, No JVD, No murmurs, +1 edema  Respiratory: Lungs clear to auscultation	  Psychiatry: A & O x 3, Mood & affect appropriate  Gastrointestinal:  Soft, Non-tender, + BS	  Skin: No rashes, No ecchymoses, No cyanosis	  Neurologic: Non-focal  Extremities: Normal range of motion, No clubbing, cyanosis +1 edema  Vascular: Peripheral pulses palpable 2+ bilaterally    MEDICATIONS  (STANDING):  aspirin  chewable 81 milliGRAM(s) Oral daily  atorvastatin 40 milliGRAM(s) Oral at bedtime  carvedilol 6.25 milliGRAM(s) Oral every 12 hours  furosemide   Injectable 80 milliGRAM(s) IV Push two times a day  heparin  Injectable 5000 Unit(s) SubCutaneous every 8 hours  hydrALAZINE 25 milliGRAM(s) Oral every 8 hours  insulin lispro (HumaLOG) corrective regimen sliding scale   SubCutaneous three times a day before meals  insulin lispro (HumaLOG) corrective regimen sliding scale   SubCutaneous at bedtime  isosorbide   mononitrate ER Tablet (IMDUR) 30 milliGRAM(s) Oral daily  potassium chloride    Tablet ER 20 milliEquivalent(s) Oral every 2 hours      	  LABS:	 	                        15.8   12.3  )-----------( 399      ( 25 Mar 2018 06:01 )             54.6     03-25    141  |  93<L>  |  21<H>  ----------------------------<  107<H>  3.3<L>   |  39<H>  |  1.29    Ca    8.6      25 Mar 2018 06:01  Mg     1.9     03-24    TPro  6.1  /  Alb  x   /  TBili  x   /  DBili  x   /  AST  x   /  ALT  x   /  AlkPhos  x   03-24    proBNP: Serum Pro-Brain Natriuretic Peptide: 1572 pg/mL (03-22 @ 22:05)    Lipid Profile: Cholesterol 117  LDL 50  HDL 37      TSH: Thyroid Stimulating Hormone, Serum: 3.09 uU/mL (03-23 @ 03:27)    hepatitis-.  andriy 1:80

## 2018-03-25 NOTE — DISCHARGE NOTE ADULT - NSTOBACCOHOTLINE_GEN_A_CS
[facility] Smokers Quitline ([id number]) [facility] Smokers Quitline ([id number])

## 2018-03-25 NOTE — DISCHARGE NOTE ADULT - CARE PROVIDER_API CALL
Chelsea Hoffmann), Internal Medicine  [address] [place] [id number]  Phone: [phone]  Fax: [phone]

## 2018-10-16 ENCOUNTER — TRANSCRIPTION ENCOUNTER (OUTPATIENT)
Age: 43
End: 2018-10-16

## 2018-10-16 ENCOUNTER — INPATIENT (INPATIENT)
Facility: HOSPITAL | Age: 43
LOS: 1 days | Discharge: HOME | DRG: 286 | End: 2018-10-18
Attending: INTERNAL MEDICINE | Admitting: INTERNAL MEDICINE
Payer: COMMERCIAL

## 2018-10-16 VITALS
RESPIRATION RATE: 16 BRPM | DIASTOLIC BLOOD PRESSURE: 70 MMHG | SYSTOLIC BLOOD PRESSURE: 153 MMHG | HEIGHT: 66 IN | OXYGEN SATURATION: 97 % | TEMPERATURE: 98 F | WEIGHT: 315 LBS | HEART RATE: 69 BPM

## 2018-10-16 DIAGNOSIS — I50.22 CHRONIC SYSTOLIC (CONGESTIVE) HEART FAILURE: ICD-10-CM

## 2018-10-16 PROBLEM — I10 ESSENTIAL (PRIMARY) HYPERTENSION: Chronic | Status: ACTIVE | Noted: 2018-03-23

## 2018-10-16 LAB
ALBUMIN SERPL ELPH-MCNC: 3.7 G/DL — SIGNIFICANT CHANGE UP (ref 3.3–5)
ALP SERPL-CCNC: 84 U/L — SIGNIFICANT CHANGE UP (ref 40–120)
ALT FLD-CCNC: 23 U/L — SIGNIFICANT CHANGE UP (ref 10–45)
ANION GAP SERPL CALC-SCNC: 14 MMOL/L — SIGNIFICANT CHANGE UP (ref 5–17)
AST SERPL-CCNC: 18 U/L — SIGNIFICANT CHANGE UP (ref 10–40)
BILIRUB SERPL-MCNC: 1.8 MG/DL — HIGH (ref 0.2–1.2)
BUN SERPL-MCNC: 13 MG/DL — SIGNIFICANT CHANGE UP (ref 7–23)
CALCIUM SERPL-MCNC: 9.1 MG/DL — SIGNIFICANT CHANGE UP (ref 8.4–10.5)
CHLORIDE SERPL-SCNC: 94 MMOL/L — LOW (ref 96–108)
CO2 SERPL-SCNC: 32 MMOL/L — HIGH (ref 22–31)
CREAT SERPL-MCNC: 1.13 MG/DL — SIGNIFICANT CHANGE UP (ref 0.5–1.3)
GLUCOSE BLDC GLUCOMTR-MCNC: 139 MG/DL — HIGH (ref 70–99)
GLUCOSE BLDC GLUCOMTR-MCNC: 156 MG/DL — HIGH (ref 70–99)
GLUCOSE SERPL-MCNC: 132 MG/DL — HIGH (ref 70–99)
HBA1C BLD-MCNC: 7.6 % — HIGH (ref 4–5.6)
HCT VFR BLD CALC: 58.6 % — CRITICAL HIGH (ref 39–50)
HGB BLD-MCNC: 17.8 G/DL — HIGH (ref 13–17)
MCHC RBC-ENTMCNC: 25.4 PG — LOW (ref 27–34)
MCHC RBC-ENTMCNC: 30.3 GM/DL — LOW (ref 32–36)
MCV RBC AUTO: 83.6 FL — SIGNIFICANT CHANGE UP (ref 80–100)
PLATELET # BLD AUTO: 284 K/UL — SIGNIFICANT CHANGE UP (ref 150–400)
POTASSIUM SERPL-MCNC: 3.7 MMOL/L — SIGNIFICANT CHANGE UP (ref 3.5–5.3)
POTASSIUM SERPL-SCNC: 3.7 MMOL/L — SIGNIFICANT CHANGE UP (ref 3.5–5.3)
PROT SERPL-MCNC: 7.4 G/DL — SIGNIFICANT CHANGE UP (ref 6–8.3)
RBC # BLD: 7.01 M/UL — HIGH (ref 4.2–5.8)
RBC # FLD: 19.4 % — HIGH (ref 10.3–14.5)
SODIUM SERPL-SCNC: 140 MMOL/L — SIGNIFICANT CHANGE UP (ref 135–145)
WBC # BLD: 11.4 K/UL — HIGH (ref 3.8–10.5)
WBC # FLD AUTO: 11.4 K/UL — HIGH (ref 3.8–10.5)

## 2018-10-16 PROCEDURE — 93306 TTE W/DOPPLER COMPLETE: CPT | Mod: 26

## 2018-10-16 PROCEDURE — 93010 ELECTROCARDIOGRAM REPORT: CPT

## 2018-10-16 RX ORDER — ASPIRIN/CALCIUM CARB/MAGNESIUM 324 MG
81 TABLET ORAL DAILY
Qty: 0 | Refills: 0 | Status: DISCONTINUED | OUTPATIENT
Start: 2018-10-16 | End: 2018-10-17

## 2018-10-16 RX ORDER — DEXTROSE 50 % IN WATER 50 %
12.5 SYRINGE (ML) INTRAVENOUS ONCE
Qty: 0 | Refills: 0 | Status: DISCONTINUED | OUTPATIENT
Start: 2018-10-16 | End: 2018-10-18

## 2018-10-16 RX ORDER — INSULIN LISPRO 100/ML
VIAL (ML) SUBCUTANEOUS AT BEDTIME
Qty: 0 | Refills: 0 | Status: DISCONTINUED | OUTPATIENT
Start: 2018-10-16 | End: 2018-10-18

## 2018-10-16 RX ORDER — ACETAMINOPHEN 500 MG
975 TABLET ORAL ONCE
Qty: 0 | Refills: 0 | Status: COMPLETED | OUTPATIENT
Start: 2018-10-16 | End: 2018-10-16

## 2018-10-16 RX ORDER — GLUCAGON INJECTION, SOLUTION 0.5 MG/.1ML
1 INJECTION, SOLUTION SUBCUTANEOUS ONCE
Qty: 0 | Refills: 0 | Status: DISCONTINUED | OUTPATIENT
Start: 2018-10-16 | End: 2018-10-18

## 2018-10-16 RX ORDER — DEXTROSE 50 % IN WATER 50 %
15 SYRINGE (ML) INTRAVENOUS ONCE
Qty: 0 | Refills: 0 | Status: DISCONTINUED | OUTPATIENT
Start: 2018-10-16 | End: 2018-10-18

## 2018-10-16 RX ORDER — CARVEDILOL PHOSPHATE 80 MG/1
6.25 CAPSULE, EXTENDED RELEASE ORAL EVERY 12 HOURS
Qty: 0 | Refills: 0 | Status: DISCONTINUED | OUTPATIENT
Start: 2018-10-16 | End: 2018-10-18

## 2018-10-16 RX ORDER — SACUBITRIL AND VALSARTAN 24; 26 MG/1; MG/1
1 TABLET, FILM COATED ORAL
Qty: 0 | Refills: 0 | Status: DISCONTINUED | OUTPATIENT
Start: 2018-10-16 | End: 2018-10-17

## 2018-10-16 RX ORDER — DEXTROSE 50 % IN WATER 50 %
25 SYRINGE (ML) INTRAVENOUS ONCE
Qty: 0 | Refills: 0 | Status: DISCONTINUED | OUTPATIENT
Start: 2018-10-16 | End: 2018-10-18

## 2018-10-16 RX ORDER — FUROSEMIDE 40 MG
40 TABLET ORAL DAILY
Qty: 0 | Refills: 0 | Status: DISCONTINUED | OUTPATIENT
Start: 2018-10-17 | End: 2018-10-17

## 2018-10-16 RX ORDER — POTASSIUM CHLORIDE 20 MEQ
40 PACKET (EA) ORAL ONCE
Qty: 0 | Refills: 0 | Status: COMPLETED | OUTPATIENT
Start: 2018-10-16 | End: 2018-10-16

## 2018-10-16 RX ORDER — ATORVASTATIN CALCIUM 80 MG/1
40 TABLET, FILM COATED ORAL AT BEDTIME
Qty: 0 | Refills: 0 | Status: DISCONTINUED | OUTPATIENT
Start: 2018-10-16 | End: 2018-10-18

## 2018-10-16 RX ORDER — METFORMIN HYDROCHLORIDE 850 MG/1
1 TABLET ORAL
Qty: 60 | Refills: 0 | OUTPATIENT
Start: 2018-10-16 | End: 2018-11-14

## 2018-10-16 RX ORDER — INSULIN LISPRO 100/ML
VIAL (ML) SUBCUTANEOUS
Qty: 0 | Refills: 0 | Status: DISCONTINUED | OUTPATIENT
Start: 2018-10-16 | End: 2018-10-18

## 2018-10-16 RX ORDER — FUROSEMIDE 40 MG
40 TABLET ORAL ONCE
Qty: 0 | Refills: 0 | Status: COMPLETED | OUTPATIENT
Start: 2018-10-16 | End: 2018-10-16

## 2018-10-16 RX ORDER — SODIUM CHLORIDE 9 MG/ML
1000 INJECTION, SOLUTION INTRAVENOUS
Qty: 0 | Refills: 0 | Status: DISCONTINUED | OUTPATIENT
Start: 2018-10-16 | End: 2018-10-18

## 2018-10-16 RX ORDER — ACETAMINOPHEN 500 MG
650 TABLET ORAL EVERY 6 HOURS
Qty: 0 | Refills: 0 | Status: DISCONTINUED | OUTPATIENT
Start: 2018-10-16 | End: 2018-10-18

## 2018-10-16 RX ADMIN — Medication 650 MILLIGRAM(S): at 22:33

## 2018-10-16 RX ADMIN — SACUBITRIL AND VALSARTAN 1 TABLET(S): 24; 26 TABLET, FILM COATED ORAL at 18:11

## 2018-10-16 RX ADMIN — Medication 975 MILLIGRAM(S): at 18:28

## 2018-10-16 RX ADMIN — Medication 1: at 18:10

## 2018-10-16 RX ADMIN — Medication 975 MILLIGRAM(S): at 19:10

## 2018-10-16 RX ADMIN — Medication 650 MILLIGRAM(S): at 22:03

## 2018-10-16 RX ADMIN — CARVEDILOL PHOSPHATE 6.25 MILLIGRAM(S): 80 CAPSULE, EXTENDED RELEASE ORAL at 18:10

## 2018-10-16 RX ADMIN — Medication 40 MILLIGRAM(S): at 15:43

## 2018-10-16 RX ADMIN — Medication 40 MILLIEQUIVALENT(S): at 18:53

## 2018-10-16 NOTE — DISCHARGE NOTE ADULT - HOSPITAL COURSE
43 year old  male h/o T2DM (A1C 7.2: well controlled: PMD: Dr Heredia), HTN who has family history of early CVD and SLATER presents for R and LHC today.   Dr Bowie: cards   Pt s/p diagnostic cath via R groin. Pt tolerated procedure well and overnight remained uneventful. No c/o chest pain or SOB. Pt is hemodynamically stable, EKG and all lab results reviewed. Insertion/incision site benign, no bleeding or hematoma, and cath site dressing changed. Discharge teaching provided to Pt/caretaker and verbalized understanding the instruction. Pt is stable for discharge home as per attending.

## 2018-10-16 NOTE — H&P ADULT - ASSESSMENT
pt with severe cardiomyopathy, chf last ef ?40 wwho presented for cath for invcreasing siob and found to have sig elevation of PCWP and LVEDP  iv lasix now  will change losartan to entresto  bipqp  check renal function  echo

## 2018-10-16 NOTE — DISCHARGE NOTE ADULT - ADDITIONAL INSTRUCTIONS
Follow up with your cardiologist and primary care provider in 1-2 weeks.   Followup with Dr. Maciel in 1 week

## 2018-10-16 NOTE — DISCHARGE NOTE ADULT - PLAN OF CARE
You will not be short of breath. No heavy lifting, strenuous activity, bending, straining, or unnecessary stair climbing for 2 weeks. No driving for 2 days. You may shower 24 hours following the procedure but avoid baths/swimming for 1 week. Check your groin site for bleeding and/or swelling daily following procedure and call your doctor immediately if it occurs or if you experience increased pain at the site. Follow up with your cardiologist in 1-2 weeks. You may call [facility] if you have any questions/concerns regarding your procedure [phone].   Take your medications as prescribed. Follow a low-salt, low salt, low cholesterol heart healthy diet. Weigh yourself every day and keep a record; call your doctor if you gain 2 pounds over one to two days or 5 pounds over three days. Get to or maintain a healthy weight; ask your heart failure team for referrals to a registered dietitian if needed. Avoid alcohol. Be active (check with your physician or cardiologist first). Find healthy ways to deal with stress, such as deep breathing, meditation, exercise, and doing hobbies that you enjoy. If you smoke, quit. (A resource to help you stop smoking is the [facility] – phone number 411-378-7120.). Your hemoglobin A1C will be less than 7 Continue to follow with your primary care MD or your endocrinologist.  Follow a heart healthy diabetic diet. If you check your fingerstick glucose at home, call your MD if it is greater than 250mg/dL on 2 occasions or less than 100mg/dL on 2 occasions. Know signs of low blood sugar, such as: dizziness, shakiness, sweating, confusion, hunger, nervousness-drink 4 ounces apple juice if occurs and call your doctor. Know early signs of high blood sugar, such as: frequent urination, increased thirst, blurry vision, fatigue, headache - call your doctor if this occurs. Follow with other practitioners to care for your diabetes, such as ophthamologist and podiatrist. your hemoglobin will be stable follow up with Dr. Scott Hernández  pt to be scheduled outpatient for phlebotomy pt. weight will be stable and will not be short of breath. low fat low salt diet  take meds as prescribed  daily weights Pt. discharged on home oxygen; home CPAP

## 2018-10-16 NOTE — DISCHARGE NOTE ADULT - CARE PLAN
Principal Discharge DX:	Dyspnea on exertion  Goal:	You will not be short of breath.  Assessment and plan of treatment:	No heavy lifting, strenuous activity, bending, straining, or unnecessary stair climbing for 2 weeks. No driving for 2 days. You may shower 24 hours following the procedure but avoid baths/swimming for 1 week. Check your groin site for bleeding and/or swelling daily following procedure and call your doctor immediately if it occurs or if you experience increased pain at the site. Follow up with your cardiologist in 1-2 weeks. You may call [facility] if you have any questions/concerns regarding your procedure [phone].   Take your medications as prescribed. Follow a low-salt, low salt, low cholesterol heart healthy diet. Weigh yourself every day and keep a record; call your doctor if you gain 2 pounds over one to two days or 5 pounds over three days. Get to or maintain a healthy weight; ask your heart failure team for referrals to a registered dietitian if needed. Avoid alcohol. Be active (check with your physician or cardiologist first). Find healthy ways to deal with stress, such as deep breathing, meditation, exercise, and doing hobbies that you enjoy. If you smoke, quit. (A resource to help you stop smoking is the [facility] – phone number [phone].).  Secondary Diagnosis:	Type 2 diabetes mellitus with other circulatory complication  Goal:	Your hemoglobin A1C will be less than 7  Assessment and plan of treatment:	Continue to follow with your primary care MD or your endocrinologist.  Follow a heart healthy diabetic diet. If you check your fingerstick glucose at home, call your MD if it is greater than 250mg/dL on 2 occasions or less than 100mg/dL on 2 occasions. Know signs of low blood sugar, such as: dizziness, shakiness, sweating, confusion, hunger, nervousness-drink 4 ounces apple juice if occurs and call your doctor. Know early signs of high blood sugar, such as: frequent urination, increased thirst, blurry vision, fatigue, headache - call your doctor if this occurs. Follow with other practitioners to care for your diabetes, such as ophthamologist and podiatrist. Principal Discharge DX:	Dyspnea on exertion  Goal:	You will not be short of breath.  Assessment and plan of treatment:	No heavy lifting, strenuous activity, bending, straining, or unnecessary stair climbing for 2 weeks. No driving for 2 days. You may shower 24 hours following the procedure but avoid baths/swimming for 1 week. Check your groin site for bleeding and/or swelling daily following procedure and call your doctor immediately if it occurs or if you experience increased pain at the site. Follow up with your cardiologist in 1-2 weeks. You may call [facility] if you have any questions/concerns regarding your procedure [phone].   Take your medications as prescribed. Follow a low-salt, low salt, low cholesterol heart healthy diet. Weigh yourself every day and keep a record; call your doctor if you gain 2 pounds over one to two days or 5 pounds over three days. Get to or maintain a healthy weight; ask your heart failure team for referrals to a registered dietitian if needed. Avoid alcohol. Be active (check with your physician or cardiologist first). Find healthy ways to deal with stress, such as deep breathing, meditation, exercise, and doing hobbies that you enjoy. If you smoke, quit. (A resource to help you stop smoking is the [facility] – phone number [phone].).  Secondary Diagnosis:	Type 2 diabetes mellitus with other circulatory complication  Goal:	Your hemoglobin A1C will be less than 7  Assessment and plan of treatment:	Continue to follow with your primary care MD or your endocrinologist.  Follow a heart healthy diabetic diet. If you check your fingerstick glucose at home, call your MD if it is greater than 250mg/dL on 2 occasions or less than 100mg/dL on 2 occasions. Know signs of low blood sugar, such as: dizziness, shakiness, sweating, confusion, hunger, nervousness-drink 4 ounces apple juice if occurs and call your doctor. Know early signs of high blood sugar, such as: frequent urination, increased thirst, blurry vision, fatigue, headache - call your doctor if this occurs. Follow with other practitioners to care for your diabetes, such as ophthamologist and podiatrist.  Secondary Diagnosis:	Polycythemia, secondary  Goal:	your hemoglobin will be stable  Assessment and plan of treatment:	follow up with Dr. Scott Hernández  pt to be scheduled outpatient for phlebotomy  Secondary Diagnosis:	Heart failure  Goal:	pt. weight will be stable and will not be short of breath.  Assessment and plan of treatment:	low fat low salt diet  take meds as prescribed  daily weights  Goal:	Pt. discharged on home oxygen; home CPAP

## 2018-10-16 NOTE — DISCHARGE NOTE ADULT - DURABLE MEDICAL EQUIPMENT AGENCY
[facility] for delivery of home O2/PORTABLE@BEDSIDE prior to d/c.  [phone]

## 2018-10-16 NOTE — H&P CARDIOLOGY - HISTORY OF PRESENT ILLNESS
43 year old  male h/o T2DM (A1C 7.2: well controlled: PMD: Dr Heredia), HTN who has family history of early CVD and SLATER presents for R and LHC today.       Dr Bowie: cards

## 2018-10-16 NOTE — DISCHARGE NOTE ADULT - PATIENT PORTAL LINK FT
You can access the [facility] Patient Portal, offered by [facility], by registering with the following website: [email or web address]

## 2018-10-16 NOTE — DISCHARGE NOTE ADULT - MEDICATION SUMMARY - MEDICATIONS TO TAKE
I will START or STAY ON the medications listed below when I get home from the hospital:    aspirin 81 mg oral tablet, chewable  -- 1 tab(s) by mouth once a day  -- Indication: For Preventative heart measure    Entresto 49 mg-51 mg oral tablet  -- 1 tab(s) by mouth 2 times a day   -- Check with your doctor before becoming pregnant.  Obtain medical advice before taking any non-prescription drugs as some may affect the action of this medication.  Store this medication in the original package.    -- Indication: For heart failure    metFORMIN 500 mg oral tablet  -- 1 tab(s) by mouth 2 times a day   Restart on 10/19/18  -- Check with your doctor before becoming pregnant.  Do not drink alcoholic beverages when taking this medication.  It is very important that you take or use this exactly as directed.  Do not skip doses or discontinue unless directed by your doctor.  Obtain medical advice before taking any non-prescription drugs as some may affect the action of this medication.  Take with food or milk.    -- Indication: For Diabetes    atorvastatin 40 mg oral tablet  -- 1 tab(s) by mouth once a day (at bedtime)  -- Indication: For elevated cholesterol     carvedilol 6.25 mg oral tablet  -- 1 tab(s) by mouth every 12 hours  -- Indication: For heart failure    furosemide 40 mg oral tablet  -- 1 tab(s) by mouth once a day   -- Avoid prolonged or excessive exposure to direct and/or artificial sunlight while taking this medication.  It is very important that you take or use this exactly as directed.  Do not skip doses or discontinue unless directed by your doctor.  It may be advisable to drink a full glass orange juice or eat a banana daily while taking this medication.    -- Indication: For heart failure diuretic I will START or STAY ON the medications listed below when I get home from the hospital:    aspirin 81 mg oral tablet, chewable  -- 1 tab(s) by mouth once a day  -- Indication: For Preventative heart measure    sacubitril-valsartan 49 mg-51 mg oral tablet  -- 1 tab(s) by mouth 2 times a day  -- Indication: For Heart failure    metFORMIN 500 mg oral tablet  -- 1 tab(s) by mouth 2 times a day   Restart on 10/19/18  -- Check with your doctor before becoming pregnant.  Do not drink alcoholic beverages when taking this medication.  It is very important that you take or use this exactly as directed.  Do not skip doses or discontinue unless directed by your doctor.  Obtain medical advice before taking any non-prescription drugs as some may affect the action of this medication.  Take with food or milk.    -- Indication: For Diabetes    atorvastatin 40 mg oral tablet  -- 1 tab(s) by mouth once a day (at bedtime)  -- Indication: For elevated cholesterol     carvedilol 6.25 mg oral tablet  -- 1 tab(s) by mouth every 12 hours  -- Indication: For heart failure    furosemide 40 mg oral tablet  -- 1 tab(s) by mouth once a day   -- Avoid prolonged or excessive exposure to direct and/or artificial sunlight while taking this medication.  It is very important that you take or use this exactly as directed.  Do not skip doses or discontinue unless directed by your doctor.  It may be advisable to drink a full glass orange juice or eat a banana daily while taking this medication.    -- Indication: For heart failure diuretic

## 2018-10-16 NOTE — H&P ADULT - HISTORY OF PRESENT ILLNESS
CHIEF COMPLAINT:Patient is a 43y old  Male who presents with a chief complaint of sob    HPI:  43 year old  male h/o T2DM (A1C 7.2: well controlled: PMD: Dr Heredia), HTN who has family history of early CVD and SLATER presents for R and LHC today.   post cath with sig elevation of PCWP up to 38 and severe increase LVEDP associated with sob,as well as sig elevation of blood pressure      PAST MEDICAL & SURGICAL HISTORY:  HTN (hypertension)  DM (diabetes mellitus): type 2  No significant past surgical history  sleep apnea      MEDICATIONS  (STANDING):  aspirin  chewable 81 milliGRAM(s) Oral daily  atorvastatin 40 milliGRAM(s) Oral at bedtime  carvedilol 6.25 milliGRAM(s) Oral every 12 hours  dextrose 5%. 1000 milliLiter(s) (50 mL/Hr) IV Continuous <Continuous>  dextrose 50% Injectable 12.5 Gram(s) IV Push once  dextrose 50% Injectable 25 Gram(s) IV Push once  dextrose 50% Injectable 25 Gram(s) IV Push once  insulin lispro (HumaLOG) corrective regimen sliding scale   SubCutaneous three times a day before meals  insulin lispro (HumaLOG) corrective regimen sliding scale   SubCutaneous at bedtime  sacubitril 24 mG/valsartan 26 mG 1 Tablet(s) Oral two times a day    MEDICATIONS  (PRN):  dextrose 40% Gel 15 Gram(s) Oral once PRN Blood Glucose LESS THAN 70 milliGRAM(s)/deciliter  glucagon  Injectable 1 milliGRAM(s) IntraMuscular once PRN Glucose LESS THAN 70 milligrams/deciliter      FAMILY HISTORY:  Family history of hypertension in mother  Family history of diabetes mellitus  Family history of early CAD (Sibling)  Family history of colon cancer      SOCIAL HISTORY:    [ ] Non-smoker  [ ] Smoker  [ ] Alcohol    Allergies    No Known Allergies    Intolerances    	    REVIEW OF SYSTEMS:  CONSTITUTIONAL: No fever, weight loss, or fatigue  EYES: No eye pain, visual disturbances, or discharge  ENT:  No difficulty hearing, tinnitus, vertigo; No sinus or throat pain  NECK: No pain or stiffness  RESPIRATORY: No cough, wheezing, chills or hemoptysis; + Shortness of Breath  CARDIOVASCULAR: No chest pain, palpitations, passing out, dizziness, or leg swelling  GASTROINTESTINAL: No abdominal or epigastric pain. No nausea, vomiting, or hematemesis; No diarrhea or constipation. No melena or hematochezia.  GENITOURINARY: No dysuria, frequency, hematuria, or incontinence  NEUROLOGICAL: No headaches, memory loss, loss of strength, numbness, or tremors  SKIN: No itching, burning, rashes, or lesions   LYMPH Nodes: No enlarged glands  ENDOCRINE: No heat or cold intolerance; No hair loss  MUSCULOSKELETAL: No joint pain or swelling; No muscle, back, or extremity pain  PSYCHIATRIC: No depression, anxiety, mood swings, or difficulty sleeping  HEME/LYMPH: No easy bruising, or bleeding gums  ALLERGY AND IMMUNOLOGIC: No hives or eczema	    [ ] All others negative	  [ ] Unable to obtain    PHYSICAL EXAM:  T(C): 36.8 (10-16-18 @ 15:50), Max: 36.8 (10-16-18 @ 15:50)  HR: 80 (10-16-18 @ 15:50) (69 - 80)  BP: 163/104 (10-16-18 @ 15:50) (153/70 - 163/104)  RR: 16 (10-16-18 @ 15:50) (16 - 16)  SpO2: 96% (10-16-18 @ 15:50) (96% - 97%)  Wt(kg): --  I&O's Summary      Appearance: Normal	  HEENT:   Normal oral mucosa, PERRL, EOMI	  Lymphatic: No lymphadenopathy  Cardiovascular: Normal S1 S2, No JVD, No murmurs, No edema  Respiratory: Lungs clear to auscultation	  Psychiatry: A & O x 3, Mood & affect appropriate  Gastrointestinal:  Soft, Non-tender, + BS	  Skin: No rashes, No ecchymoses, No cyanosis	  Neurologic: Non-focal  Extremities: Normal range of motion, No clubbing, cyanosis or edema  Vascular: Peripheral pulses palpable 2+ bilaterally    TELEMETRY: 	    ECG:  	  RADIOLOGY:  OTHER: 	  	  LABS:	 	    CARDIAC MARKERS:                              17.8   11.4  )-----------( 284      ( 16 Oct 2018 10:58 )             58.6     10-16    140  |  94<L>  |  13  ----------------------------<  132<H>  3.7   |  32<H>  |  1.13    Ca    9.1      16 Oct 2018 10:58    TPro  7.4  /  Alb  3.7  /  TBili  1.8<H>  /  DBili  x   /  AST  18  /  ALT  23  /  AlkPhos  84  10-16    proBNP:   Lipid Profile:   HgA1c:   TSH:       PREVIOUS DIAGNOSTIC TESTING:    < from: Cardiac Cath Lab - Adult (10.16.18 @ 12:09) >  Pressures:  -- Aortic Pressure (S/D/M): 167/114/136  Pressures:  -- Left Ventricle (s/edp): 174/44/--  Pressures:  -- Pulmonary Artery (S/D/M): 61/35/44  Pressures:  -- Pulmonary Capillary Wedge: 38/49/38  Pressures:  -- Right Atrium (a/v/M): 22/18/16  Pressures:  -- Right Ventricle (s/edp): 58/20/--    < end of copied text >

## 2018-10-17 DIAGNOSIS — D75.1 SECONDARY POLYCYTHEMIA: ICD-10-CM

## 2018-10-17 LAB
ANION GAP SERPL CALC-SCNC: 11 MMOL/L — SIGNIFICANT CHANGE UP (ref 5–17)
BASE EXCESS BLDA CALC-SCNC: 11.5 MMOL/L — HIGH (ref -2–2)
BUN SERPL-MCNC: 15 MG/DL — SIGNIFICANT CHANGE UP (ref 7–23)
CALCIUM SERPL-MCNC: 9.3 MG/DL — SIGNIFICANT CHANGE UP (ref 8.4–10.5)
CHLORIDE SERPL-SCNC: 95 MMOL/L — LOW (ref 96–108)
CO2 BLDA-SCNC: 39 MMOL/L — HIGH (ref 22–30)
CO2 SERPL-SCNC: 31 MMOL/L — SIGNIFICANT CHANGE UP (ref 22–31)
CREAT SERPL-MCNC: 1.26 MG/DL — SIGNIFICANT CHANGE UP (ref 0.5–1.3)
GAS PNL BLDA: SIGNIFICANT CHANGE UP
GLUCOSE BLDC GLUCOMTR-MCNC: 130 MG/DL — HIGH (ref 70–99)
GLUCOSE BLDC GLUCOMTR-MCNC: 143 MG/DL — HIGH (ref 70–99)
GLUCOSE BLDC GLUCOMTR-MCNC: 145 MG/DL — HIGH (ref 70–99)
GLUCOSE BLDC GLUCOMTR-MCNC: 89 MG/DL — SIGNIFICANT CHANGE UP (ref 70–99)
GLUCOSE SERPL-MCNC: 102 MG/DL — HIGH (ref 70–99)
HBA1C BLD-MCNC: 7.5 % — HIGH (ref 4–5.6)
HCO3 BLDA-SCNC: 38 MMOL/L — HIGH (ref 21–29)
HCT VFR BLD CALC: 57.3 % — CRITICAL HIGH (ref 39–50)
HGB BLD-MCNC: 17.8 G/DL — HIGH (ref 13–17)
HOROWITZ INDEX BLDA+IHG-RTO: 21 — SIGNIFICANT CHANGE UP
MCHC RBC-ENTMCNC: 25.9 PG — LOW (ref 27–34)
MCHC RBC-ENTMCNC: 31 GM/DL — LOW (ref 32–36)
MCV RBC AUTO: 83.6 FL — SIGNIFICANT CHANGE UP (ref 80–100)
PCO2 BLDA: 52 MMHG — HIGH (ref 32–46)
PH BLDA: 7.48 — HIGH (ref 7.35–7.45)
PLATELET # BLD AUTO: 313 K/UL — SIGNIFICANT CHANGE UP (ref 150–400)
PO2 BLDA: 54 MMHG — LOW (ref 74–108)
POTASSIUM SERPL-MCNC: 4 MMOL/L — SIGNIFICANT CHANGE UP (ref 3.5–5.3)
POTASSIUM SERPL-SCNC: 4 MMOL/L — SIGNIFICANT CHANGE UP (ref 3.5–5.3)
RBC # BLD: 6.86 M/UL — HIGH (ref 4.2–5.8)
RBC # FLD: 19.6 % — HIGH (ref 10.3–14.5)
SAO2 % BLDA: 87 % — LOW (ref 92–96)
SODIUM SERPL-SCNC: 137 MMOL/L — SIGNIFICANT CHANGE UP (ref 135–145)
WBC # BLD: 13.6 K/UL — HIGH (ref 3.8–10.5)
WBC # FLD AUTO: 13.6 K/UL — HIGH (ref 3.8–10.5)

## 2018-10-17 RX ORDER — FUROSEMIDE 40 MG
20 TABLET ORAL DAILY
Qty: 0 | Refills: 0 | Status: DISCONTINUED | OUTPATIENT
Start: 2018-10-18 | End: 2018-10-18

## 2018-10-17 RX ORDER — HEPARIN SODIUM 5000 [USP'U]/ML
5000 INJECTION INTRAVENOUS; SUBCUTANEOUS EVERY 8 HOURS
Qty: 0 | Refills: 0 | Status: DISCONTINUED | OUTPATIENT
Start: 2018-10-17 | End: 2018-10-18

## 2018-10-17 RX ORDER — SACUBITRIL AND VALSARTAN 24; 26 MG/1; MG/1
1 TABLET, FILM COATED ORAL
Qty: 60 | Refills: 0 | OUTPATIENT
Start: 2018-10-17 | End: 2018-11-15

## 2018-10-17 RX ORDER — HYDRALAZINE HCL 50 MG
10 TABLET ORAL THREE TIMES A DAY
Qty: 0 | Refills: 0 | Status: DISCONTINUED | OUTPATIENT
Start: 2018-10-17 | End: 2018-10-17

## 2018-10-17 RX ORDER — SACUBITRIL AND VALSARTAN 24; 26 MG/1; MG/1
1 TABLET, FILM COATED ORAL
Qty: 0 | Refills: 0 | Status: DISCONTINUED | OUTPATIENT
Start: 2018-10-17 | End: 2018-10-18

## 2018-10-17 RX ORDER — ASPIRIN/CALCIUM CARB/MAGNESIUM 324 MG
325 TABLET ORAL DAILY
Qty: 0 | Refills: 0 | Status: DISCONTINUED | OUTPATIENT
Start: 2018-10-17 | End: 2018-10-18

## 2018-10-17 RX ADMIN — HEPARIN SODIUM 5000 UNIT(S): 5000 INJECTION INTRAVENOUS; SUBCUTANEOUS at 21:31

## 2018-10-17 RX ADMIN — SACUBITRIL AND VALSARTAN 1 TABLET(S): 24; 26 TABLET, FILM COATED ORAL at 17:23

## 2018-10-17 RX ADMIN — Medication 650 MILLIGRAM(S): at 18:15

## 2018-10-17 RX ADMIN — Medication 650 MILLIGRAM(S): at 17:23

## 2018-10-17 RX ADMIN — HEPARIN SODIUM 5000 UNIT(S): 5000 INJECTION INTRAVENOUS; SUBCUTANEOUS at 15:02

## 2018-10-17 RX ADMIN — CARVEDILOL PHOSPHATE 6.25 MILLIGRAM(S): 80 CAPSULE, EXTENDED RELEASE ORAL at 05:39

## 2018-10-17 RX ADMIN — SACUBITRIL AND VALSARTAN 1 TABLET(S): 24; 26 TABLET, FILM COATED ORAL at 05:39

## 2018-10-17 RX ADMIN — Medication 81 MILLIGRAM(S): at 05:39

## 2018-10-17 RX ADMIN — CARVEDILOL PHOSPHATE 6.25 MILLIGRAM(S): 80 CAPSULE, EXTENDED RELEASE ORAL at 17:23

## 2018-10-17 RX ADMIN — Medication 40 MILLIGRAM(S): at 05:39

## 2018-10-17 NOTE — CONSULT NOTE ADULT - PROBLEM SELECTOR RECOMMENDATION 9
will arrange for therapeutic phlebotomy in am. This will be continued as outpatient. Plan to remove 250 cc blood so as to not increase risk for vasovagal event, in light of cardiac disease. Check iron levels. Rationale and goals for phlebotomies discussed.

## 2018-10-17 NOTE — CONSULT NOTE ADULT - SUBJECTIVE AND OBJECTIVE BOX
Chief Complaint:    HPI:  CHIEF COMPLAINT:Patient is a 43y old  Male who presents with a chief complaint of sob    HPI:  43 year old  male h/o morbid obesity, T2DM (A1C 7.2: well controlled: PMD: Dr Heredia), HTN who has family history of early CVD and SLATER admitted for R/L heart cath to evaluate progessive SOB and weakness.     post cath with sig elevation of PCWP up to 38 and severe increase LVEDP associated with sob,as well as sig elevation of blood pressure    We are consulted for elevated hgb of 17.8. He denies prior hematologic history. No pruritus or erythromelalgia.     PAST MEDICAL & SURGICAL HISTORY:  HTN (hypertension)  DM (diabetes mellitus): type 2  No significant past surgical history  sleep apnea      Allergies    No Known Allergies    PAST MEDICAL & SURGICAL HISTORY:  HTN (hypertension)  DM (diabetes mellitus): type 2  No significant past surgical history      SOCIAL HISTORY:  Smoking - Non smoker   Alcohol - Social  Drugs - No drug use    FAMILY HISTORY:  Family history of hypertension in mother  Family history of diabetes mellitus  Family history of early CAD (Sibling)  Family history of colon cancer      MEDICATIONS  (STANDING):  aspirin enteric coated 325 milliGRAM(s) Oral daily  atorvastatin 40 milliGRAM(s) Oral at bedtime  carvedilol 6.25 milliGRAM(s) Oral every 12 hours  dextrose 5%. 1000 milliLiter(s) (50 mL/Hr) IV Continuous <Continuous>  dextrose 50% Injectable 12.5 Gram(s) IV Push once  dextrose 50% Injectable 25 Gram(s) IV Push once  dextrose 50% Injectable 25 Gram(s) IV Push once  heparin  Injectable 5000 Unit(s) SubCutaneous every 8 hours  insulin lispro (HumaLOG) corrective regimen sliding scale   SubCutaneous three times a day before meals  insulin lispro (HumaLOG) corrective regimen sliding scale   SubCutaneous at bedtime  sacubitril 49 mG/valsartan 51 mG 1 Tablet(s) Oral two times a day    MEDICATIONS  (PRN):  acetaminophen   Tablet .. 650 milliGRAM(s) Oral every 6 hours PRN Mild Pain (1 - 3)  dextrose 40% Gel 15 Gram(s) Oral once PRN Blood Glucose LESS THAN 70 milliGRAM(s)/deciliter  glucagon  Injectable 1 milliGRAM(s) IntraMuscular once PRN Glucose LESS THAN 70 milligrams/deciliter      Vital Signs Last 24 Hrs  T(C): 36.7 (17 Oct 2018 12:26), Max: 36.9 (16 Oct 2018 21:13)  T(F): 98 (17 Oct 2018 12:26), Max: 98.5 (16 Oct 2018 21:13)  HR: 67 (17 Oct 2018 17:20) (67 - 108)  BP: 150/83 (17 Oct 2018 17:20) (147/74 - 165/98)  BP(mean): --  RR: 18 (17 Oct 2018 17:20) (16 - 18)  SpO2: 91% (17 Oct 2018 17:20) (91% - 100%)      PHYSICAL EXAM:      Constitutional: morbidly obese, pickwickian appearance, in NAD    Eyes: anicteric    ENMT:    Neck:    Lymph nodes:none    Respiratory:clear    Cardiovascular:RRR    Gastrointestinal: nontender. no splenomegaly, although exam limited by obesity    Extremities: mild edema    Skin:                    LABS:                          17.8   13.6  )-----------( 313      ( 16 Oct 2018 23:54 )             57.3         Mean Cell Volume : 83.6 fl  Mean Cell Hemoglobin : 25.9 pg  Mean Cell Hemoglobin Concentration : 31.0 gm/dL  Auto Neutrophil # : x  Auto Lymphocyte # : x  Auto Monocyte # : x  Auto Eosinophil # : x  Auto Basophil # : x  Auto Neutrophil % : x  Auto Lymphocyte % : x  Auto Monocyte % : x  Auto Eosinophil % : x  Auto Basophil % : x      Serial CBC's  10-16 @ 23:54  Hct-57.3 / Hgb-17.8 / Plat-313 / RBC-6.86 / WBC-13.6  Serial CBC's  10-16 @ 10:58  Hct-58.6 / Hgb-17.8 / Plat-284 / RBC-7.01 / WBC-11.4      10-16    137  |  95<L>  |  15  ----------------------------<  102<H>  4.0   |  31  |  1.26    Ca    9.3      16 Oct 2018 23:54    TPro  7.4  /  Alb  3.7  /  TBili  1.8<H>  /  DBili  x   /  AST  18  /  ALT  23  /  AlkPhos  84  10-16

## 2018-10-17 NOTE — CONSULT NOTE ADULT - NSHPATTENDINGPLANDISCUSS_GEN_ALL_CORE
patient, Debi Quiñones and Janell
cardiology, hemetology, outpatient sleep MD, patient, medical team

## 2018-10-17 NOTE — CONSULT NOTE ADULT - SUBJECTIVE AND OBJECTIVE BOX
full note to follow full note to follow   came to see pt, pt signing out AMA  will defer care to primary team

## 2018-10-17 NOTE — CONSULT NOTE ADULT - ASSESSMENT
43 year old man with LV dysfunction, pulm HTN, MARA and morbid obesity with a polycythemia likely secondary in etiology. Doubt primary myeloproliferative disorder. Although high HGB is compensatory in nature, this degree of polycythemia increases stroke risk and therefore should be treated

## 2018-10-17 NOTE — CONSULT NOTE ADULT - SUBJECTIVE AND OBJECTIVE BOX
PULMONARY CONSULT  Mravel Quiñones MD  [phone]    Initial HPI on admission:  HPI:  CHIEF COMPLAINT:Patient is a 43y old  Male who presents with a chief complaint of sob    HPI:  43 year old  male h/o T2DM (A1C 7.2: well controlled: PMD: Dr Heredia), HTN who has family history of early CVD and SLATER presents for R and LHC today.   post cath with sig elevation of PCWP up to 38 and severe increase LVEDP associated with sob,as well as sig elevation of blood pressure    Patient has long standing history of sleep apnea, though had not used CPAP. He was recently tested in  (Dr Rushing: [phone]) and received rx for full mask CPAP (17) which was not filled. He was admitted for elective R and L heart cath. Recent outpatient TTE/MUGA reportedly showed LVEF of 40% per cardiologist. He is a non smoker and denies history of COPD or ashtma. He denies significant recent LE edema, chest pain, SLATER/PND. He denies history of VTE. Hct on admission was 58 with repeat of 57: denies history of prior polycythemia        PAST MEDICAL & SURGICAL HISTORY:  HTN (hypertension)  DM (diabetes mellitus): type 2  No significant past surgical history  sleep apnea      MEDICATIONS  (STANDING):  aspirin  chewable 81 milliGRAM(s) Oral daily  atorvastatin 40 milliGRAM(s) Oral at bedtime  carvedilol 6.25 milliGRAM(s) Oral every 12 hours  dextrose 5%. 1000 milliLiter(s) (50 mL/Hr) IV Continuous <Continuous>  dextrose 50% Injectable 12.5 Gram(s) IV Push once  dextrose 50% Injectable 25 Gram(s) IV Push once  dextrose 50% Injectable 25 Gram(s) IV Push once  insulin lispro (HumaLOG) corrective regimen sliding scale   SubCutaneous three times a day before meals  insulin lispro (HumaLOG) corrective regimen sliding scale   SubCutaneous at bedtime  sacubitril 24 mG/valsartan 26 mG 1 Tablet(s) Oral two times a day    MEDICATIONS  (PRN):  dextrose 40% Gel 15 Gram(s) Oral once PRN Blood Glucose LESS THAN 70 milliGRAM(s)/deciliter  glucagon  Injectable 1 milliGRAM(s) IntraMuscular once PRN Glucose LESS THAN 70 milligrams/deciliter      FAMILY HISTORY:  Family history of hypertension in mother  Family history of diabetes mellitus  Family history of early CAD (Sibling)  Family history of colon cancer      SOCIAL HISTORY:    [ ] Non-smoker  [ ] Smoker  [ ] Alcohol    Allergies    No Known Allergies    Intolerances    	    REVIEW OF SYSTEMS:  CONSTITUTIONAL: No fever, weight loss, or fatigue  EYES: No eye pain, visual disturbances, or discharge  ENT:  No difficulty hearing, tinnitus, vertigo; No sinus or throat pain  NECK: No pain or stiffness  RESPIRATORY: No cough, wheezing, chills or hemoptysis; + Shortness of Breath  CARDIOVASCULAR: No chest pain, palpitations, passing out, dizziness, or leg swelling  GASTROINTESTINAL: No abdominal or epigastric pain. No nausea, vomiting, or hematemesis; No diarrhea or constipation. No melena or hematochezia.  GENITOURINARY: No dysuria, frequency, hematuria, or incontinence  NEUROLOGICAL: No headaches, memory loss, loss of strength, numbness, or tremors  SKIN: No itching, burning, rashes, or lesions   LYMPH Nodes: No enlarged glands  ENDOCRINE: No heat or cold intolerance; No hair loss  MUSCULOSKELETAL: No joint pain or swelling; No muscle, back, or extremity pain  PSYCHIATRIC: No depression, anxiety, mood swings, or difficulty sleeping  HEME/LYMPH: No easy bruising, or bleeding gums  ALLERGY AND IMMUNOLOGIC: No hives or eczema	    [ ] All others negative	  [ ] Unable to obtain    PHYSICAL EXAM:  T(C): 36.8 (10-16-18 @ 15:50), Max: 36.8 (10-16-18 @ 15:50)  HR: 80 (10-16-18 @ 15:50) (69 - 80)  BP: 163/104 (10-16-18 @ 15:50) (153/70 - 163/104)  RR: 16 (10-16-18 @ 15:50) (16 - 16)  SpO2: 96% (10-16-18 @ 15:50) (96% - 97%)  Wt(kg): --  I&O's Summary      Appearance: Normal	  HEENT:   Normal oral mucosa, PERRL, EOMI	  Lymphatic: No lymphadenopathy  Cardiovascular: Normal S1 S2, No JVD, No murmurs, No edema  Respiratory: Lungs clear to auscultation	  Psychiatry: A & O x 3, Mood & affect appropriate  Gastrointestinal:  Soft, Non-tender, + BS	  Skin: No rashes, No ecchymoses, No cyanosis	  Neurologic: Non-focal  Extremities: Normal range of motion, No clubbing, cyanosis or edema  Vascular: Peripheral pulses palpable 2+ bilaterally    TELEMETRY: 	    ECG:  	  RADIOLOGY:  OTHER: 	  	  LABS:	 	    CARDIAC MARKERS:                              17.8   11.4  )-----------( 284      ( 16 Oct 2018 10:58 )             58.6     10-    140  |  94<L>  |  13  ----------------------------<  132<H>  3.7   |  32<H>  |  1.13    Ca    9.1      16 Oct 2018 10:58    TPro  7.4  /  Alb  3.7  /  TBili  1.8<H>  /  DBili  x   /  AST  18  /  ALT  23  /  AlkPhos  84  10-16    proBNP:   Lipid Profile:   HgA1c:   TSH:       PREVIOUS DIAGNOSTIC TESTING:    < from: Cardiac Cath Lab - Adult (10.16.18 @ 12:09) >  Pressures:  -- Aortic Pressure (S/D/M): 167/114/136  Pressures:  -- Left Ventricle (s/edp): 174/44/--  Pressures:  -- Pulmonary Artery (S/D/M): 61/35/44  Pressures:  -- Pulmonary Capillary Wedge: 38/49/38  Pressures:  -- Right Atrium (a/v/M): 22/18/16  Pressures:  -- Right Ventricle (s/edp): 58/20/--      PAST MEDICAL & SURGICAL HISTORY:  HTN (hypertension)  DM (diabetes mellitus): type 2  No significant past surgical history    Allergies    No Known Allergies    Intolerances      FAMILY HISTORY:  Family history of hypertension in mother  Family history of diabetes mellitus  Family history of early CAD (Sibling)  Family history of colon cancer    Social history:   REVIEW OF SYSTEMS:  CONSTITUTIONAL: No fever, weight loss, or fatigue  EYES: No eye pain, visual disturbances, or discharge  ENT:  No difficulty hearing, tinnitus, vertigo; No sinus or throat pain  NECK: No pain or stiffness  RESPIRATORY: No cough, wheezing, chills or hemoptysis; + Shortness of Breath  CARDIOVASCULAR: No chest pain, palpitations, passing out, dizziness, or leg swelling  GASTROINTESTINAL: No abdominal or epigastric pain. No nausea, vomiting, or hematemesis; No diarrhea or constipation. No melena or hematochezia.  GENITOURINARY: No dysuria, frequency, hematuria, or incontinence  NEUROLOGICAL: No headaches, memory loss, loss of strength, numbness, or tremors  SKIN: No itching, burning, rashes, or lesions   LYMPH Nodes: No enlarged glands  ENDOCRINE: No heat or cold intolerance; No hair loss  MUSCULOSKELETAL: No joint pain or swelling; No muscle, back, or extremity pain  PSYCHIATRIC: No depression, anxiety, mood swings, or difficulty sleeping  HEME/LYMPH: No easy bruising, or bleeding gums  ALLERGY AND IMMUNOLOGIC: No hives or eczema	    Medications:  MEDICATIONS  (STANDING):  aspirin enteric coated 325 milliGRAM(s) Oral daily  atorvastatin 40 milliGRAM(s) Oral at bedtime  carvedilol 6.25 milliGRAM(s) Oral every 12 hours  dextrose 5%. 1000 milliLiter(s) (50 mL/Hr) IV Continuous <Continuous>  dextrose 50% Injectable 12.5 Gram(s) IV Push once  dextrose 50% Injectable 25 Gram(s) IV Push once  dextrose 50% Injectable 25 Gram(s) IV Push once  furosemide   Injectable 40 milliGRAM(s) IV Push daily  heparin  Injectable 5000 Unit(s) SubCutaneous every 8 hours  hydrALAZINE 10 milliGRAM(s) Oral three times a day  insulin lispro (HumaLOG) corrective regimen sliding scale   SubCutaneous three times a day before meals  insulin lispro (HumaLOG) corrective regimen sliding scale   SubCutaneous at bedtime  sacubitril 49 mG/valsartan 51 mG 1 Tablet(s) Oral two times a day    MEDICATIONS  (PRN):  acetaminophen   Tablet .. 650 milliGRAM(s) Oral every 6 hours PRN Mild Pain (1 - 3)  dextrose 40% Gel 15 Gram(s) Oral once PRN Blood Glucose LESS THAN 70 milliGRAM(s)/deciliter  glucagon  Injectable 1 milliGRAM(s) IntraMuscular once PRN Glucose LESS THAN 70 milligrams/deciliter    Vital Signs Last 24 Hrs  T(C): 36.6 (17 Oct 2018 05:31), Max: 36.9 (16 Oct 2018 21:13)  T(F): 97.8 (17 Oct 2018 05:31), Max: 98.5 (16 Oct 2018 21:13)  HR: 71 (17 Oct 2018 05:31) (71 - 108)  BP: 148/96 (17 Oct 2018 05:31) (148/96 - 165/98)  BP(mean): --  RR: 17 (17 Oct 2018 05:31) (16 - 17)  SpO2: 98% (17 Oct 2018 05:31) (96% - 100%)    ABG - ( 17 Oct 2018 10:56 )  pH, Arterial: 7.48  pH, Blood: x     /  pCO2: 52    /  pO2: 54    / HCO3: 38    / Base Excess: 11.5  /  SaO2: 87                    10-16 @ 07:01  -  10 @ 07:00  --------------------------------------------------------  IN: 1440 mL / OUT: 0 mL / NET: 1440 mL      LABS:                        17.8   13.6  )-----------( 313      ( 16 Oct 2018 23:54 )             57.3     10-    137  |  95<L>  |  15  ----------------------------<  102<H>  4.0   |  31  |  1.26    Ca    9.3      16 Oct 2018 23:54    TPro  7.4  /  Alb  3.7  /  TBili  1.8<H>  /  DBili  x   /  AST  18  /  ALT  23  /  AlkPhos  84  10-16    Physical Examination:    General: Non toxic, No acute distress.  Sat 86% on RA    HEENT: Pupils equal, reactive to light.  Symmetric.    PULM: Few basilar crackles; otherwise clear    CVS: Regular rate and rhythm, no murmurs, rubs, or gallops    ABD: Soft, nondistended, nontender, normoactive bowel sounds, no masses    EXT: Trace bilateral LE edema   SKIN: Warm and well perfused, no rashes noted.    NEURO: Alert, oriented, interactive, nonfocal    RADIOLOGY REVIEWED PERSONALLY  CXR:  < from: Xray Chest 2 Views PA/Lat (18 @ 22:11) >  EXAM:  XR CHEST PA LAT 2V                            PROCEDURE DATE:  2018          INTERPRETATION:  Chest    Indication: Shortness of breath, peripheral edema    Technique: Frontal and lateral views of the chest are submitted and   compared to a previous study of 2017.    Findings:    The aorta is uncoiled. The cardiac silhouette is enlarged. There is no   pulmonary vascular congestion. The lungs are clear. The visualized   osseous structures are unremarkable.    Impression:    No significant interval change compared to the previous study of 2017.  Cardiomegaly.  No acute cardiopulmonary process.  CT chest:    TTE:    < from: TTE with Doppler (w/Cont) (10.16.18 @ 15:33) >  Patient name: OLIVIA JIN  YOB: 1975   Age: 43 (M)   MR#: [id number]  Study Date: 10/16/2018  Location: [place] [address]  DSonographer: Eve Ramirez RDCS  Study quality: Technically difficult due to p  Referring Physician: Darwin Bowie MD  Blood Pressure: 163/104 mmHg  Height: 168 cm  Weight: 158 kg  BSA: 2.5 m2  ------------------------------------------------------------------------  PROCEDURE: Transthoracic echocardiogram with 2-D, M-Mode  and complete spectral and color flow Doppler. Verbal  consent was obtained for injection of  Ultrasonic Enhancing  Agent following a discussion of risks and benefits.  Following intravenous injection of Ultrasonic Enhancing  Agent , harmonic imaging was performed.  INDICATION: Unspecified combined systolic (congestive) and  diastolic (congestive) heart failure (I50.40)  ------------------------------------------------------------------------  Dimensions:    Normal Values:  LA:     4.3    2.0 - 4.0 cm  Ao:     4.0    2.0 - 3.8 cm  SEPTUM: 1.2    0.6 - 1.2 cm  PWT:    1.3    0.6 - 1.1 cm  LVIDd:  5.5    3.0 - 5.6 cm  LVIDs:  3.8    1.8 - 4.0 cm  Derived variables:  LVMI: 114 g/m2  RWT: 0.47  Doppler Peak Velocity (m/sec): AoV=1.1  ------------------------------------------------------------------------  Observations:  Mitral Valve: Normal mitral valve. Minimal mitral  regurgitation.  Aortic Valve/Aorta: Aortic valve not well visualized;  appears to be a normal trileaflet valve with normal  opening. Peak transaortic valve gradient equals 5 mm Hg. No  aortic valve regurgitation seen. Peak left ventricular  outflow tract gradient equals 2 mm Hg.  Aortic Root: 4 cm.  Left Atrium: Normal left atrium.  LA volume index = 28  cc/m2.  Left Ventricle: Endocardial visualization enhanced with  intravenous injection of Ultrasonic Enhancing Agent  (Definity). Left ventricle suboptimally visualized despite  the intravenous injeciton of ultrasonic enhancing agent;  grossly preserved left ventricular systolic function.  Unable to exclude the presence of segmental wall motion  abnormalities. Mild concentric left ventricular  hypertrophy.  Right Heart: Normal right atrium. The right ventricle is  not well visualized; grossly normal right ventricular  systolic function. Tricuspid valve not well visualized,  probably normal. Minimal tricuspid regurgitation. Normal  pulmonic valve.  Pericardium/Pleura: Normal pericardium with no pericardial  effusion.  Hemodynamic: Estimated right atrial pressure is 8 mm Hg.  Inadequate tricuspid regurgitation Doppler envelope  precludes estimation of RVSP.  ------------------------------------------------------------------------  Conclusions:  Technically difficult study.  1. Normal mitral valve. Minimal mitral regurgitation.  2. Aortic valvenot well visualized; appears to be a normal  trileaflet valve with normal opening. No aortic valve  regurgitation seen.  3. Mild concentric left ventricular hypertrophy.  4. Endocardial visualization enhanced with intravenous  injection of UltrasonicEnhancing Agent (Definity). Left  ventricle suboptimally visualized despite the intravenous  injeciton of ultrasonic enhancing agent; grossly preserved  left ventricular systolic function. Unable to exclude the  presence of segmental wall motion abnormalities.  5. The right ventricle is not well visualized; grossly  normal right ventricular systolic function.  6. Inadequate tricuspid regurgitation Doppler envelope  precludes estimation of RVSP.  < from: Cardiac Cath Lab - Adult (10.16.18 @ 12:09) >  [facility]  Department of Cardiology  [address] [place] [id number] [phone]  Cath Lab Report -- Comprehensive Report  Patient: OLIVIA JIN  Study date: 10/16/2018  Account number: [id number]  MR number: [id number]  : 1975  Gender: Male  Race: O  Case Physician(s):  Darwin Bowie DO  Referring Physician:  DO Meir Wyman D.O.  INDICATIONS: Abnormal stress test.  HISTORY: There was no prior cardiac history. The patient has hypertension,  diabetes, and morbid obesity.  PROCEDURE:  --  Right heart catheterization.  --  Left heart catheterization.  --  Left coronary angiography.  --  Right coronary angiography.  TECHNIQUE: The risks and alternatives of the procedures and conscious  sedation were explained to the patient and informed consent was obtained.  Cardiac catheterization performed electively.  Local anesthetic given. Right femoral artery access. A 5FR SHEATH PINNACLE  was inserted in the vessel. Right femoral vein access. A 7FR SHEATH  PINNACLE was inserted in the vessel. Right heart catheterization. The  procedure was performed utilizing a 7FR SWAN CARY catheter. Left heart  catheterization. A 5FR  EXPO catheter was utilized. Left coronary  artery angiography. The vessel was injected utilizing a 5FR FL4.0 EXPO  catheter. Right coronary artery angiography. The vessel was injected  utilizing a 5FR FR4.0 EXPO catheter. RADIATION EXPOSURE: 9 min.  CONTRAST GIVEN: Omnipaque 61 ml.  MEDICATIONS GIVEN: Fentanyl, 25 mcg, IV.  HEMODYNAMICS: Hemodynamic assessment demonstrates moderate systemic  hypertension, severely elevated LVEDP, and markedly elevated pulmonary  capillary wedge pressure.  VENTRICLES: No left ventriculogram was performed.  CORONARY VESSELS: The coronary circulation is co-dominant.  LM:   --  LM: Normal.  LAD:   --  LAD: Normal.  CX:   --  Circumflex: Normal.  RCA:   --  RCA: Normal.  COMPLICATIONS: There were no complications.  SUMMARY:  HEMODYNAMICS: Hemodynamic assessment demonstrates moderate systemic  hypertension, severely elevated LVEDP, and markedly elevated pulmonary  capillary wedge pressure.  DIAGNOSTIC IMPRESSIONS: The coronary anatomy is normal.  DIAGNOSTIC RECOMMENDATIONS: Patient management should include weight  reduction. Medical management is recommended.  Prepared and signed by  Darwin Bowie DO  Signed 10/16/2018 12:50:43  HEMODYNAMIC TABLES  Pressures:  Baseline  Pressures:  - HR: 64  Pressures:  - Rhythm:  Pressures:  -- Aortic Pressure (S/D/M): 167/114/136  Pressures:  -- Left Ventricle (s/edp): 174/44/--  Pressures:  -- Pulmonary Artery (S/D/M): 61/35/44  Pressures:  -- Pulmonary Capillary Wedge: 38/49/38  Pressures:  -- Right Atrium (a/v/M): 22/18/16  Pressures:  -- Right Ventricle (s/edp): 58/20/--  O2 Sats:  Baseline  O2 Sats:  - HR: 64  O2 Sats:  - Rhythm:  O2 Sats:  -- AO: 17.3/89/20.94  O2 Sats:  -- PA: 17.4/62.2/14.72  Outputs:  Baseline  Outputs:  -- CALCULATIONS[age]  Outputs:  -- CALCULATIONS: Body Surface Area: 2.54  Outputs:  -- CALCULATIONS: Height in cm: 168.00  Outputs:  -- CALCULATIONS: Sex: Male  Outputs:  -- CALCULATIONS: Weight in k.80  Outputs:  -- OUTPUTS: Blood Oxygen Difference: 6.22  Outputs:  -- OUTPUTS: CO by Ruben: 5.11  Outputs:  -- OUTPUTS: Ruben cardiac index: 2.01  Outputs:  -- OUTPUTS: O2 consumption: 317.68  Outputs:  -- OUTPUTS: Vo2 Indexed: 125.00  Outputs:  -- RESISTANCES: Left ventricular stroke work: 98.64  Outputs:  -- RESISTANCES: Left Ventricular Stroke Work index: 38.81  Outputs:  -- RESISTANCES: Pulmonary vascular index (dsc): 238.82  Outputs:  -- RESISTANCES: Pulmonary vascular index (Wood Units): 2.99  Outputs:  -- RESISTANCES: Pulmonary vascular resistance (dsc): 93.97  Outputs:  -- RESISTANCES: Pulmonary vascular resistance (Wood Units): 1.17  Outputs:  -- RESISTANCES: PVR_SVR Ratio: 0.05  Outputs:  -- RESISTANCES: Right ventricular stroke work: 30.87  Outputs:  -- RESISTANCES: Right ventricular stroke work index: 12.15  Outputs:  -- RESISTANCES: Systemic vascular index (dsc): 4776.47  Outputs:  -- RESISTANCES: Systemic vascular index (Wood Units): 59.72  Outputs:  -- RESISTANCES: Systemic vascular resistance (dsc): 1879.45  Outputs:  -- RESISTANCES: Systemic vascular resistance (Wood Units): 23.50  Outputs:  --RESISTANCES: Total pulmonary index (dsc): 1751.37  Outputs:  -- RESISTANCES: Total pulmonary index (Wood Units): 21.90  Outputs:  -- RESISTANCES: Total pulmonary resistance (dsc): 689.13  Outputs:  -- RESISTANCES: Total pulmonary resistance (Wood Units): 8.62  Outputs:  -- RESISTANCES: Total vascular index (Wood Units): 67.68  Outputs:  -- RESISTANCES: Total vascular resistance (dsc): 2130.04  Outputs:  -- RESISTANCES: Total vascular resistance (Wood Units): 26.63  Outputs:  -- RESISTANCES: Totalvascular resistance index (dsc): 5413.33  Outputs:  -- RESISTANCES: TPR_TVR Ratio: 0.32  Outputs:  -- SHUNTS: Qs Indexed: 2.01  Outputs:  -- SHUNTS: Systemic flow: 5.11

## 2018-10-17 NOTE — CHART NOTE - NSCHARTNOTEFT_GEN_A_CORE
Discussed Echo results with Dr. Maciel.        Vital Signs T(C): 36.6 (10-17-18 @ 05:31), Max: 36.9 (10-16-18 @ 21:13)  HR: 71 (10-17-18 @ 05:31) (71 - 108)  BP: 148/96 (10-17-18 @ 05:31) (148/96 - 165/98)  RR: 17 (10-17-18 @ 05:31) (16 - 17)  SpO2: 98% (10-17-18 @ 05:31) (96% - 100%)  Wt(kg): --     Medications acetaminophen   Tablet .. 650 milliGRAM(s) Oral every 6 hours PRN  aspirin enteric coated 325 milliGRAM(s) Oral daily  atorvastatin 40 milliGRAM(s) Oral at bedtime  carvedilol 6.25 milliGRAM(s) Oral every 12 hours  dextrose 40% Gel 15 Gram(s) Oral once PRN  dextrose 5%. 1000 milliLiter(s) IV Continuous <Continuous>  dextrose 50% Injectable 12.5 Gram(s) IV Push once  dextrose 50% Injectable 25 Gram(s) IV Push once  dextrose 50% Injectable 25 Gram(s) IV Push once  furosemide   Injectable 40 milliGRAM(s) IV Push daily  glucagon  Injectable 1 milliGRAM(s) IntraMuscular once PRN  heparin  Injectable 5000 Unit(s) SubCutaneous every 8 hours  hydrALAZINE 10 milliGRAM(s) Oral three times a day  insulin lispro (HumaLOG) corrective regimen sliding scale   SubCutaneous three times a day before meals  insulin lispro (HumaLOG) corrective regimen sliding scale   SubCutaneous at bedtime  sacubitril 49 mG/valsartan 51 mG 1 Tablet(s) Oral two times a day    Plan:  Increase Entresto from 24/26 to 49/51 for suspected EF ~40 as per Dr. Maciel  He will have echo reread to clarify discrepancy  Dr. Rushing - Hardeep called for Sleep study results.  Pulursula following - Dr. Quiñones - Gave results pt to change from Bipap to Cpap  CPAP being arranged for home delivery by Pulm[facility]ards  Heme consult with Adalgisa Hernánedz [phone] for evaluation of his polycythemia called by pulm   Cont on tele  Do pulse ox RA with ambulation and with O2   Needs home o2 upon d/c    Nettie Daily, NP-C  Cardiology

## 2018-10-17 NOTE — PROGRESS NOTE ADULT - SUBJECTIVE AND OBJECTIVE BOX
E L E C T R [facility]  P [facility] Y S I O L O G Y     PROGRESS  NOTE   ________________________________________________    CHIEF COMPLAINT:Patient is a 43y old  Male who presents with a chief complaint of sob/chf (17 Oct 2018 12:02)  seems tacchypnic.  	  REVIEW OF SYSTEMS:  CONSTITUTIONAL: No fever, weight loss, or fatigue  EYES: No eye pain, visual disturbances, or discharge  ENT:  No difficulty hearing, tinnitus, vertigo; No sinus or throat pain  NECK: No pain or stiffness  RESPIRATORY: No cough, wheezing, chills or hemoptysis; No Shortness of Breath  CARDIOVASCULAR: No chest pain, palpitations, passing out, dizziness, or leg swelling  GASTROINTESTINAL: No abdominal or epigastric pain. No nausea, vomiting, or hematemesis; No diarrhea or constipation. No melena or hematochezia.  GENITOURINARY: No dysuria, frequency, hematuria, or incontinence  NEUROLOGICAL: No headaches, memory loss, loss of strength, numbness, or tremors  SKIN: No itching, burning, rashes, or lesions   LYMPH Nodes: No enlarged glands  ENDOCRINE: No heat or cold intolerance; No hair loss  MUSCULOSKELETAL: No joint pain or swelling; No muscle, back, or extremity pain  PSYCHIATRIC: No depression, anxiety, mood swings, or difficulty sleeping  HEME/LYMPH: No easy bruising, or bleeding gums  ALLERGY AND IMMUNOLOGIC: No hives or eczema	    [ ] All others negative	  [ ] Unable to obtain    PHYSICAL EXAM:  T(C): 36.7 (10-17-18 @ 12:26), Max: 36.9 (10-16-18 @ 21:13)  HR: 69 (10-17-18 @ 12:26) (69 - 108)  BP: 147/74 (10-17-18 @ 12:26) (147/74 - 165/98)  RR: 17 (10-17-18 @ 12:26) (16 - 17)  SpO2: 97% (10-17-18 @ 12:26) (96% - 100%)  Wt(kg): --  I&O's Summary    16 Oct 2018 07:01  -  17 Oct 2018 07:00  --------------------------------------------------------  IN: 1440 mL / OUT: 0 mL / NET: 1440 mL    17 Oct 2018 07:01  -  17 Oct 2018 14:31  --------------------------------------------------------  IN: 520 mL / OUT: 0 mL / NET: 520 mL        Appearance: Normal	  HEENT:   Normal oral mucosa, PERRL, EOMI	  Lymphatic: No lymphadenopathy  Cardiovascular: Normal S1 S2, No JVD, No murmurs, No edema  Respiratory: Lungs clear to auscultation	  Psychiatry: A & O x 3, Mood & affect appropriate  Gastrointestinal:  Soft, Non-tender, + BS	  Skin: No rashes, No ecchymoses, No cyanosis	  Neurologic: Non-focal  Extremities: Normal range of motion, No clubbing, cyanosis or edema  Vascular: Peripheral pulses palpable 2+ bilaterally    MEDICATIONS  (STANDING):  aspirin enteric coated 325 milliGRAM(s) Oral daily  atorvastatin 40 milliGRAM(s) Oral at bedtime  carvedilol 6.25 milliGRAM(s) Oral every 12 hours  dextrose 5%. 1000 milliLiter(s) (50 mL/Hr) IV Continuous <Continuous>  dextrose 50% Injectable 12.5 Gram(s) IV Push once  dextrose 50% Injectable 25 Gram(s) IV Push once  dextrose 50% Injectable 25 Gram(s) IV Push once  furosemide   Injectable 40 milliGRAM(s) IV Push daily  heparin  Injectable 5000 Unit(s) SubCutaneous every 8 hours  hydrALAZINE 10 milliGRAM(s) Oral three times a day  insulin lispro (HumaLOG) corrective regimen sliding scale   SubCutaneous three times a day before meals  insulin lispro (HumaLOG) corrective regimen sliding scale   SubCutaneous at bedtime  sacubitril 49 mG/valsartan 51 mG 1 Tablet(s) Oral two times a day      TELEMETRY: 	    ECG:  	  RADIOLOGY:  OTHER: 	  	  LABS:	 	    CARDIAC MARKERS:                                17.8   13.6  )-----------( 313      ( 16 Oct 2018 23:54 )             57.3     10-16    137  |  95<L>  |  15  ----------------------------<  102<H>  4.0   |  31  |  1.26    Ca    9.3      16 Oct 2018 23:54    TPro  7.4  /  Alb  3.7  /  TBili  1.8<H>  /  DBili  x   /  AST  18  /  ALT  23  /  AlkPhos  84  10-16    proBNP:   Lipid Profile:   HgA1c: Hemoglobin A1C, Whole Blood: 7.5 % (10-17 @ 02:52)  Hemoglobin A1C, Whole Blood: 7.6 % (10-16 @ 19:08)    TSH:

## 2018-10-17 NOTE — PROGRESS NOTE ADULT - SUBJECTIVE AND OBJECTIVE BOX
- Patient seen and examined.  - In summary, patient is a 43 year old man who presented with sob/chf (16 Oct 2018 19:46)  - Today, patient is without complaints.         *****MEDICATIONS:    MEDICATIONS  (STANDING):  aspirin  chewable 81 milliGRAM(s) Oral daily  atorvastatin 40 milliGRAM(s) Oral at bedtime  carvedilol 6.25 milliGRAM(s) Oral every 12 hours  dextrose 5%. 1000 milliLiter(s) (50 mL/Hr) IV Continuous <Continuous>  dextrose 50% Injectable 12.5 Gram(s) IV Push once  dextrose 50% Injectable 25 Gram(s) IV Push once  dextrose 50% Injectable 25 Gram(s) IV Push once  furosemide   Injectable 40 milliGRAM(s) IV Push daily  hydrALAZINE 10 milliGRAM(s) Oral three times a day  insulin lispro (HumaLOG) corrective regimen sliding scale   SubCutaneous three times a day before meals  insulin lispro (HumaLOG) corrective regimen sliding scale   SubCutaneous at bedtime  sacubitril 49 mG/valsartan 51 mG 1 Tablet(s) Oral two times a day    MEDICATIONS  (PRN):  acetaminophen   Tablet .. 650 milliGRAM(s) Oral every 6 hours PRN Mild Pain (1 - 3)  dextrose 40% Gel 15 Gram(s) Oral once PRN Blood Glucose LESS THAN 70 milliGRAM(s)/deciliter  glucagon  Injectable 1 milliGRAM(s) IntraMuscular once PRN Glucose LESS THAN 70 milligrams/deciliter           ***** REVIEW OF SYSTEM:  GEN: no fever, no chills, no pain  RESP: no SOB, no cough, no sputum  CVS: no chest pain, no palpitations, no edema  GI: no abdominal pain, no nausea, no vomiting, no constipation, no diarrhea  : no dysuria, no frequency  NEURO: no headache, no dizziness  PSYCH: no depression, not anxious  Derm : no itching, no rash         ***** VITAL SIGNS:  T(F): 97.8 (10-17-18 @ 05:31), Max: 98.5 (10-16-18 @ 21:13)  HR: 71 (10-17-18 @ 05:31) (71 - 108)  BP: 148/96 (10-17-18 @ 05:31) (148/96 - 165/98)  RR: 17 (10-17-18 @ 05:31) ()  SpO2: 98% (10-17-18 @ 05:31) (96% - 100%)  Wt(kg): --  ,   I&O's Summary    16 Oct 2018 07:  -  17 Oct 2018 07:00  --------------------------------------------------------  IN: 1440 mL / OUT: 0 mL / NET: 1440 mL    17 Oct 2018 07:01  -  17 Oct 2018 11:46  --------------------------------------------------------  IN: 240 mL / OUT: 0 mL / NET: 240 mL             *****PHYSICAL EXAM:  GEN: A&O X 3 , NAD , comfortable  HEENT: NCAT, EOMI, MMM, no icterus  NECK: Supple, No JVD  CVS: S1S2 , regular , No M/R/G appreciated  PULM: CTA B/L,  no W/R/R appreciated  ABD.: soft. non tender, non distended,  bowel sounds present  Extrem: intact pulses , no edema noted  Derm: No rash or ecchymosis noted  PSYCH: normal mood, no depression, not anxious         *****LAB AND IMAGIN.8   13.6  )-----------( 313      ( 16 Oct 2018 23:54 )             57.3               10-    137  |  95<L>  |  15  ----------------------------<  102<H>  4.0   |  31  |  1.26    Ca    9.3      16 Oct 2018 23:54    TPro  7.4  /  Alb  3.7  /  TBili  1.8<H>  /  DBili  x   /  AST  18  /  ALT  23  /  AlkPhos  84  10-                     ABG - ( 17 Oct 2018 10:56 )  pH, Arterial: 7.48  pH, Blood: x     /  pCO2: 52    /  pO2: 54    / HCO3: 38    / Base Excess: 11.5  /  SaO2: 87          [All pertinent recent Imaging/Reports reviewed]         *****A S S E S S M E N T   A N D   P L A N :  43M with cardiomyopathy who presented for cath  recent nuc stress with ef 43%  RHC significantly elevated pressures  systemic HTN as well  lasix to keep O>I  on BB, entresto  MARA diagnosed, not yet treated  bipap started  pulm eval  echo noted      __________________________  A. DAVIS Bowie

## 2018-10-17 NOTE — CONSULT NOTE ADULT - ASSESSMENT
Obesity with recently documented MARA with CPAP titration at 17  Chronic hypoxemia: O2 sats 85% at rest, decreasing to 81-87% while ambulating on RA  R heart cath indicates moderate pulmonary hypertension with no PAD -PCW gradient and elevated LVEDP 44 and PCW 38 - this is compatible with pulm htn secondary to LV failure, though hypoxemia, sleep apnea, and hypoxemia is likely a contributing factor despite absence of gradient during cath  Systemic hypertension  Secondary Polycythemia    REC    Heme evaluation: patient would benefit from phlebotomy prior to dishcarge  Aspirin therapy  Continue diuretics as tolerated: CHF management per cardiology  CPAP to be delivered at home upon discharge: d/w sleep physician following pt  WIll require nasal oxygen titrated to sat >/= 90%, to start at 2 liters/min, around clock Obesity with recently documented MARA with CPAP titration at 17  Chronic hypoxemia: O2 sats 85% at rest, decreasing to 81-87% while ambulating on RA  R heart cath indicates moderate pulmonary hypertension with no PAD -PCW gradient and elevated LVEDP 44 and PCW 38 - this is compatible with pulm htn secondary to LV failure, though hypoxemia, sleep apnea, and hypoxemia is likely a contributing factor despite absence of gradient during cath  Systemic hypertension  Secondary Polycythemia  Mild hypercapnea with alkalosis    REC    Heme evaluation: patient would benefit from phlebotomy prior to dishcarge  Aspirin therapy  Continue diuretics as tolerated: CHF management per cardiology  CPAP to be delivered at home upon discharge: d/w sleep physician following pt  WIll require nasal oxygen titrated to sat >/= 90%, to start at 2 liters/min, around clock

## 2018-10-17 NOTE — CONSULT NOTE ADULT - CONSULT REASON
Sleep Apnea/Dyspnea/Hypercapneic and Hypoxemic Resp Failure
medical optimization / follow up
polycythemia

## 2018-10-17 NOTE — PROGRESS NOTE ADULT - ASSESSMENT
pt with sig hypoxemia with evidence of erythrocytosis  pt needs raymundo o2/cpap/called hem/pulmonary  will review echo doubt ef normal   increase entresto  continue coreg  if pt decided not to stay needs to sign out AMA.

## 2018-10-18 VITALS
TEMPERATURE: 98 F | RESPIRATION RATE: 18 BRPM | DIASTOLIC BLOOD PRESSURE: 96 MMHG | OXYGEN SATURATION: 98 % | HEART RATE: 75 BPM | SYSTOLIC BLOOD PRESSURE: 146 MMHG

## 2018-10-18 LAB
ANION GAP SERPL CALC-SCNC: 13 MMOL/L — SIGNIFICANT CHANGE UP (ref 5–17)
BUN SERPL-MCNC: 18 MG/DL — SIGNIFICANT CHANGE UP (ref 7–23)
CALCIUM SERPL-MCNC: 9.4 MG/DL — SIGNIFICANT CHANGE UP (ref 8.4–10.5)
CHLORIDE SERPL-SCNC: 95 MMOL/L — LOW (ref 96–108)
CO2 SERPL-SCNC: 30 MMOL/L — SIGNIFICANT CHANGE UP (ref 22–31)
CREAT SERPL-MCNC: 1.19 MG/DL — SIGNIFICANT CHANGE UP (ref 0.5–1.3)
FERRITIN SERPL-MCNC: 90 NG/ML — SIGNIFICANT CHANGE UP (ref 30–400)
GLUCOSE BLDC GLUCOMTR-MCNC: 130 MG/DL — HIGH (ref 70–99)
GLUCOSE BLDC GLUCOMTR-MCNC: 135 MG/DL — HIGH (ref 70–99)
GLUCOSE SERPL-MCNC: 125 MG/DL — HIGH (ref 70–99)
HCT VFR BLD CALC: 59.4 % — CRITICAL HIGH (ref 39–50)
HGB BLD-MCNC: 18.3 G/DL — HIGH (ref 13–17)
IRON SATN MFR SERPL: 17 % — SIGNIFICANT CHANGE UP (ref 16–55)
IRON SATN MFR SERPL: 63 UG/DL — SIGNIFICANT CHANGE UP (ref 45–165)
MCHC RBC-ENTMCNC: 25.7 PG — LOW (ref 27–34)
MCHC RBC-ENTMCNC: 30.8 GM/DL — LOW (ref 32–36)
MCV RBC AUTO: 83.4 FL — SIGNIFICANT CHANGE UP (ref 80–100)
PLATELET # BLD AUTO: 277 K/UL — SIGNIFICANT CHANGE UP (ref 150–400)
POTASSIUM SERPL-MCNC: 3.6 MMOL/L — SIGNIFICANT CHANGE UP (ref 3.5–5.3)
POTASSIUM SERPL-SCNC: 3.6 MMOL/L — SIGNIFICANT CHANGE UP (ref 3.5–5.3)
RBC # BLD: 7.12 M/UL — HIGH (ref 4.2–5.8)
RBC # FLD: 19.6 % — HIGH (ref 10.3–14.5)
SODIUM SERPL-SCNC: 138 MMOL/L — SIGNIFICANT CHANGE UP (ref 135–145)
TIBC SERPL-MCNC: 375 UG/DL — SIGNIFICANT CHANGE UP (ref 220–430)
TSH SERPL-MCNC: 4.88 UIU/ML — HIGH (ref 0.27–4.2)
UIBC SERPL-MCNC: 312 UG/DL — SIGNIFICANT CHANGE UP (ref 110–370)
WBC # BLD: 12.2 K/UL — HIGH (ref 3.8–10.5)
WBC # FLD AUTO: 12.2 K/UL — HIGH (ref 3.8–10.5)

## 2018-10-18 PROCEDURE — 82728 ASSAY OF FERRITIN: CPT

## 2018-10-18 PROCEDURE — 82962 GLUCOSE BLOOD TEST: CPT

## 2018-10-18 PROCEDURE — 85027 COMPLETE CBC AUTOMATED: CPT

## 2018-10-18 PROCEDURE — 93460 R&L HRT ART/VENTRICLE ANGIO: CPT

## 2018-10-18 PROCEDURE — 80048 BASIC METABOLIC PNL TOTAL CA: CPT

## 2018-10-18 PROCEDURE — 83036 HEMOGLOBIN GLYCOSYLATED A1C: CPT

## 2018-10-18 PROCEDURE — 36514 APHERESIS PLASMA: CPT

## 2018-10-18 PROCEDURE — 80053 COMPREHEN METABOLIC PANEL: CPT

## 2018-10-18 PROCEDURE — 36600 WITHDRAWAL OF ARTERIAL BLOOD: CPT

## 2018-10-18 PROCEDURE — 82803 BLOOD GASES ANY COMBINATION: CPT

## 2018-10-18 PROCEDURE — 84443 ASSAY THYROID STIM HORMONE: CPT

## 2018-10-18 PROCEDURE — C8929: CPT

## 2018-10-18 PROCEDURE — 94660 CPAP INITIATION&MGMT: CPT

## 2018-10-18 PROCEDURE — 93005 ELECTROCARDIOGRAM TRACING: CPT

## 2018-10-18 PROCEDURE — C1769: CPT

## 2018-10-18 PROCEDURE — C1887: CPT

## 2018-10-18 PROCEDURE — C1894: CPT

## 2018-10-18 PROCEDURE — 83550 IRON BINDING TEST: CPT

## 2018-10-18 RX ORDER — SACUBITRIL AND VALSARTAN 24; 26 MG/1; MG/1
1 TABLET, FILM COATED ORAL
Qty: 0 | Refills: 0 | COMMUNITY
Start: 2018-10-18

## 2018-10-18 RX ORDER — SACUBITRIL AND VALSARTAN 24; 26 MG/1; MG/1
1 TABLET, FILM COATED ORAL
Qty: 60 | Refills: 0 | OUTPATIENT
Start: 2018-10-18 | End: 2018-11-16

## 2018-10-18 RX ORDER — FUROSEMIDE 40 MG
1 TABLET ORAL
Qty: 30 | Refills: 0 | OUTPATIENT
Start: 2018-10-18 | End: 2018-11-16

## 2018-10-18 RX ADMIN — SACUBITRIL AND VALSARTAN 1 TABLET(S): 24; 26 TABLET, FILM COATED ORAL at 16:58

## 2018-10-18 RX ADMIN — CARVEDILOL PHOSPHATE 6.25 MILLIGRAM(S): 80 CAPSULE, EXTENDED RELEASE ORAL at 16:58

## 2018-10-18 RX ADMIN — Medication 20 MILLIGRAM(S): at 05:47

## 2018-10-18 RX ADMIN — Medication 325 MILLIGRAM(S): at 11:30

## 2018-10-18 RX ADMIN — CARVEDILOL PHOSPHATE 6.25 MILLIGRAM(S): 80 CAPSULE, EXTENDED RELEASE ORAL at 05:47

## 2018-10-18 RX ADMIN — SACUBITRIL AND VALSARTAN 1 TABLET(S): 24; 26 TABLET, FILM COATED ORAL at 05:47

## 2018-10-18 RX ADMIN — HEPARIN SODIUM 5000 UNIT(S): 5000 INJECTION INTRAVENOUS; SUBCUTANEOUS at 05:48

## 2018-10-18 NOTE — PROGRESS NOTE ADULT - SUBJECTIVE AND OBJECTIVE BOX
Follow-up Pulm Progress Note  Marvel Quiñones MD  [phone]    No new respiratory events overnight.    Feels well: denies SOB or CP  O2 sat 87% at bedside on RA  Patient awaiting phlebotomy prior to DC    Medications:  Vital Signs Last 24 Hrs  T(C): 36.7 (18 Oct 2018 11:47), Max: 36.7 (17 Oct 2018 21:09)  T(F): 98 (18 Oct 2018 11:47), Max: 98.1 (17 Oct 2018 21:09)  HR: 79 (18 Oct 2018 11:47) (67 - 79)  BP: 138/88 (18 Oct 2018 11:47) (138/88 - 150/83)  BP(mean): --  RR: 17 (18 Oct 2018 11:47) (16 - 18)  SpO2: 99% (18 Oct 2018 11:47) (91% - 99%)  ABG - ( 17 Oct 2018 10:56 )  pH, Arterial: 7.48  pH, Blood: x     /  pCO2: 52    /  pO2: 54    / HCO3: 38    / Base Excess: 11.5  /  SaO2: 87        10-17 @ 07:01  -  10-18 @ 07:00  --------------------------------------------------------  IN: 1530 mL / OUT: 0 mL / NET: 1530 mL    LABS:                        18.3   12.2  )-----------( 277      ( 18 Oct 2018 03:15 )             59.4     10-18    138  |  95<L>  |  18  ----------------------------<  125<H>  3.6   |  30  |  1.19    Ca    9.4      18 Oct 2018 03:15    CULTURES:    Physical Examination:  PULM: Bibasilar crackles, no wheeze or rhonchi  CVS: Regular rate and rhythm, no murmurs, rubs, or gallops  ABD: Soft, non-tender  EXT:  No clubbing, cyanosis, or edema    RADIOLOGY REVIEWED  CXR:    CT chest:    TTE:

## 2018-10-18 NOTE — PROGRESS NOTE ADULT - SUBJECTIVE AND OBJECTIVE BOX
HPI:  CHIEF COMPLAINT:Patient is a 43y old  Male who presents with a chief complaint of sob    HPI:  43 year old  male h/o T2DM (A1C 7.2: well controlled: PMD: Dr Heredia), HTN who has family history of early CVD and SLATER presents for R and LHC today.   post cath with sig elevation of PCWP up to 38 and severe increase LVEDP associated with sob,as well as sig elevation of blood pressure    He has a secondary polycthemia thought secondary to MARA and cardiac disease, Feels well this am and is eager to go home      PAST MEDICAL & SURGICAL HISTORY:  HTN (hypertension)  DM (diabetes mellitus): type 2  No significant past surgical history  sleep apnea      MEDICATIONS  (STANDING):  aspirin  chewable 81 milliGRAM(s) Oral daily  atorvastatin 40 milliGRAM(s) Oral at bedtime  carvedilol 6.25 milliGRAM(s) Oral every 12 hours  dextrose 5%. 1000 milliLiter(s) (50 mL/Hr) IV Continuous <Continuous>  dextrose 50% Injectable 12.5 Gram(s) IV Push once  dextrose 50% Injectable 25 Gram(s) IV Push once  dextrose 50% Injectable 25 Gram(s) IV Push once  insulin lispro (HumaLOG) corrective regimen sliding scale   SubCutaneous three times a day before meals  insulin lispro (HumaLOG) corrective regimen sliding scale   SubCutaneous at bedtime  sacubitril 24 mG/valsartan 26 mG 1 Tablet(s) Oral two times a day    MEDICATIONS  (PRN):  dextrose 40% Gel 15 Gram(s) Oral once PRN Blood Glucose LESS THAN 70 milliGRAM(s)/deciliter  glucagon  Injectable 1 milliGRAM(s) IntraMuscular once PRN Glucose LESS THAN 70 milligrams/deciliter      FAMILY HISTORY:  Family history of hypertension in mother  Family history of diabetes mellitus  Family history of early CAD (Sibling)  Family history of colon cancer      SOCIAL HISTORY:    [ ] Non-smoker  [ ] Smoker  [ ] Alcohol    Allergies    No Known Allergies    Intolerances    	    ROS:  Negative except for: fatigue    MEDICATIONS  (STANDING):  aspirin enteric coated 325 milliGRAM(s) Oral daily  atorvastatin 40 milliGRAM(s) Oral at bedtime  carvedilol 6.25 milliGRAM(s) Oral every 12 hours  dextrose 5%. 1000 milliLiter(s) (50 mL/Hr) IV Continuous <Continuous>  dextrose 50% Injectable 12.5 Gram(s) IV Push once  dextrose 50% Injectable 25 Gram(s) IV Push once  dextrose 50% Injectable 25 Gram(s) IV Push once  furosemide   Injectable 20 milliGRAM(s) IV Push daily  heparin  Injectable 5000 Unit(s) SubCutaneous every 8 hours  insulin lispro (HumaLOG) corrective regimen sliding scale   SubCutaneous three times a day before meals  insulin lispro (HumaLOG) corrective regimen sliding scale   SubCutaneous at bedtime  sacubitril 49 mG/valsartan 51 mG 1 Tablet(s) Oral two times a day    MEDICATIONS  (PRN):  acetaminophen   Tablet .. 650 milliGRAM(s) Oral every 6 hours PRN Mild Pain (1 - 3)  dextrose 40% Gel 15 Gram(s) Oral once PRN Blood Glucose LESS THAN 70 milliGRAM(s)/deciliter  glucagon  Injectable 1 milliGRAM(s) IntraMuscular once PRN Glucose LESS THAN 70 milligrams/deciliter      Allergies    No Known Allergies    Intolerances        Vital Signs Last 24 Hrs  T(C): 36.7 (18 Oct 2018 06:00), Max: 36.7 (17 Oct 2018 12:26)  T(F): 98 (18 Oct 2018 06:00), Max: 98.1 (17 Oct 2018 21:09)  HR: 72 (18 Oct 2018 06:00) (67 - 73)  BP: 144/97 (18 Oct 2018 06:00) (140/100 - 150/83)  BP(mean): --  RR: 17 (18 Oct 2018 06:00) (16 - 18)  SpO2: 96% (18 Oct 2018 06:00) (91% - 97%)    PHYSICAL EXAM:      Constitutional: obese, in NAD    Eyes: anicteric    ENMT:    Neck:    Lymph nodes:    Respiratory:clear    Cardiovascular:RRR    Gastrointestinal:nontender    Extremities: no edema    Skin:                    LABS:                          18.3   12.2  )-----------( 277      ( 18 Oct 2018 03:15 )             59.4         Mean Cell Volume : 83.4 fl  Mean Cell Hemoglobin : 25.7 pg  Mean Cell Hemoglobin Concentration : 30.8 gm/dL  Auto Neutrophil # : x  Auto Lymphocyte # : x  Auto Monocyte # : x  Auto Eosinophil # : x  Auto Basophil # : x  Auto Neutrophil % : x  Auto Lymphocyte % : x  Auto Monocyte % : x  Auto Eosinophil % : x  Auto Basophil % : x    Serial CBC  Hematocrit 59.4  Hemoglobin 18.3  Plat 277  RBC 7.12  WBC 12.2  Serial CBC  Hematocrit 57.3  Hemoglobin 17.8  Plat 313  RBC 6.86  WBC 13.6  Serial CBC  Hematocrit 58.6  Hemoglobin 17.8  Plat 284  RBC 7.01  WBC 11.4    10-18    138  |  95<L>  |  18  ----------------------------<  125<H>  3.6   |  30  |  1.19    Ca    9.4      18 Oct 2018 03:15

## 2018-10-18 NOTE — PROVIDER CONTACT NOTE (CRITICAL VALUE NOTIFICATION) - ACTION/TREATMENT ORDERED:
cont to monitor pt very closely
Already seen by hematology planning for therapeutic phlebotomy to remove 250 cc of blood and planning for iron studies.

## 2018-10-18 NOTE — PROGRESS NOTE ADULT - SUBJECTIVE AND OBJECTIVE BOX
- Patient seen and examined.  - In summary, patient is a 43 year old man who presented with sob/chf (16 Oct 2018 19:46)  - Today, patient is without complaints.         *****MEDICATIONS:    MEDICATIONS  (STANDING):  aspirin enteric coated 325 milliGRAM(s) Oral daily  atorvastatin 40 milliGRAM(s) Oral at bedtime  carvedilol 6.25 milliGRAM(s) Oral every 12 hours  dextrose 5%. 1000 milliLiter(s) (50 mL/Hr) IV Continuous <Continuous>  dextrose 50% Injectable 12.5 Gram(s) IV Push once  dextrose 50% Injectable 25 Gram(s) IV Push once  dextrose 50% Injectable 25 Gram(s) IV Push once  furosemide   Injectable 20 milliGRAM(s) IV Push daily  heparin  Injectable 5000 Unit(s) SubCutaneous every 8 hours  insulin lispro (HumaLOG) corrective regimen sliding scale   SubCutaneous three times a day before meals  insulin lispro (HumaLOG) corrective regimen sliding scale   SubCutaneous at bedtime  sacubitril 49 mG/valsartan 51 mG 1 Tablet(s) Oral two times a day    MEDICATIONS  (PRN):  acetaminophen   Tablet .. 650 milliGRAM(s) Oral every 6 hours PRN Mild Pain (1 - 3)  dextrose 40% Gel 15 Gram(s) Oral once PRN Blood Glucose LESS THAN 70 milliGRAM(s)/deciliter  glucagon  Injectable 1 milliGRAM(s) IntraMuscular once PRN Glucose LESS THAN 70 milligrams/deciliter             ***** REVIEW OF SYSTEM:  GEN: no fever, no chills, no pain  RESP: no SOB, no cough, no sputum  CVS: no chest pain, no palpitations, no edema  GI: no abdominal pain, no nausea, no vomiting, no constipation, no diarrhea  : no dysuria, no frequency  NEURO: no headache, no dizziness  PSYCH: no depression, not anxious  Derm : no itching, no rash         ***** VITAL SIGNS:    T(F): 98 (10-18-18 @ 06:00), Max: 98.1 (10-17-18 @ 21:09)  HR: 72 (10-18-18 @ 06:00) (67 - 73)  BP: 144/97 (10-18-18 @ 06:00) (140/100 - 150/83)  RR: 17 (10-18-18 @ 06:00) (16 - 18)  SpO2: 96% (10-18-18 @ 06:00) (91% - 97%)  Wt(kg): --  ,   I&O's Summary    17 Oct 2018 07:  -  18 Oct 2018 07:00  --------------------------------------------------------  IN: 1530 mL / OUT: 0 mL / NET: 1530 mL    18 Oct 2018 07:01  -  18 Oct 2018 10:37  --------------------------------------------------------  IN: 240 mL / OUT: 0 mL / NET: 240 mL                   *****PHYSICAL EXAM:  GEN: A&O X 3 , NAD , comfortable  HEENT: NCAT, EOMI, MMM, no icterus  NECK: Supple, No JVD  CVS: S1S2 , regular , No M/R/G appreciated  PULM: CTA B/L,  no W/R/R appreciated  ABD.: soft. non tender, non distended,  bowel sounds present  Extrem: intact pulses , no edema noted  Derm: No rash or ecchymosis noted  PSYCH: normal mood, no depression, not anxious         *****LAB AND IMAGIN.3   12.2  )-----------( 277      ( 18 Oct 2018 03:15 )             59.4               10-18    138  |  95<L>  |  18  ----------------------------<  125<H>  3.6   |  30  |  1.19    Ca    9.4      18 Oct 2018 03:15    TPro  7.4  /  Alb  3.7  /  TBili  1.8<H>  /  DBili  x   /  AST  18  /  ALT  23  /  AlkPhos  84  10-16        [All pertinent recent Imaging/Reports reviewed]         *****A S S E S S M E N T   A N D   P L A N :  43M with cardiomyopathy who presented for cath  recent nuc stress with ef 43%  RHC significantly elevated pressures  systemic HTN as well, improving  lasix to keep O>I  on BB, entresto  MARA diagnosed, not yet treated  bipap started  pulursula barragan appreciated  home O2 to be arranged  echo noted  phlebotomy per heme  DCP    __________________________  JUDE Bowie D.O.

## 2018-10-18 NOTE — PROGRESS NOTE ADULT - ASSESSMENT
Obstructive sleep apnea  Morbid obesity  Systolic CHF  Hypertension  Acute on chronic hypoxemic respiratory failure  Hypercapneic resp failure - chronic  Secondary Polycythemia    REC    Phlebotomy today  Continuous nasal oxygen at 2 liters min  CPAP by face mask - to be delivered to patients home today  f/u outpatient pulmonologist, cardiologist, and hemetology

## 2018-11-12 RX ORDER — SACUBITRIL AND VALSARTAN 24; 26 MG/1; MG/1
1 TABLET, FILM COATED ORAL
Qty: 60 | Refills: 0 | OUTPATIENT
Start: 2018-11-12 | End: 2018-12-11

## 2019-05-29 NOTE — PATIENT PROFILE ADULT - PRIMARY SOURCE OF SUPPORT/COMFORT
Physical Therapy    Patient did not show up for today's therapy session, did not contact clinic to cancel. Today's was patient's last scheduled appointment, total no shows/cancellations since initial evaluation listed below.     Cancel: 5    No Show: 2    Colton Wiley, PTA  05/29/2019       sibling(s)

## 2019-10-26 NOTE — ED PROVIDER NOTE - CARE PLAN
Blaire Baires)  Obstetrics and Gynecology  [address] [place] [address]  Phone: [phone]  Fax: [phone]  Follow Up Time:
Principal Discharge DX:	Hypertensive emergency  Secondary Diagnosis:	RALF (acute kidney injury)  Secondary Diagnosis:	DM (diabetes mellitus)

## 2020-06-23 NOTE — PROGRESS NOTE ADULT - PROVIDER SPECIALTY LIST ADULT
Pulmonology Vascular    See my prior notes  CTA reviewed, patient examined. No change in Vascular status RLE. Monophasic pulses by Doppler  OR scheduled for 0730 tomorrow am.   Pre-op orders released and in Epic.    Continue Heparin today will stop in early am.

## 2020-08-27 NOTE — PATIENT PROFILE ADULT - PRO INTERPRETER NEED 2
Anesthesia Post Evaluation    Patient: Shea Highalade    Procedure(s) Performed: Procedure(s) (LRB):  Ex lap,  scar ectopic removal, uterus repair (N/A)    Final Anesthesia Type: general    Patient location during evaluation: PACU  Patient participation: Yes- Able to Participate  Level of consciousness: awake and alert  Post-procedure vital signs: reviewed and stable  Pain management: adequate  Airway patency: patent    PONV status at discharge: No PONV  Anesthetic complications: no      Cardiovascular status: blood pressure returned to baseline  Respiratory status: unassisted and spontaneous ventilation  Hydration status: euvolemic  Follow-up not needed.          Vitals Value Taken Time   /73 20 1631   Temp 36.3 °C (97.4 °F) 20 1600   Pulse 81 20 1645   Resp 18 20 1630   SpO2 100 % 20 1645   Vitals shown include unvalidated device data.      No case tracking events are documented in the log.      Pain/Rachelle Score: Pain Rating Prior to Med Admin: 7 (2020  4:16 PM)  Pain Rating Post Med Admin: 7 (2020  4:15 PM)  Rachelle Score: 10 (2020  4:15 PM)        
English

## 2020-11-24 NOTE — PATIENT PROFILE ADULT - NSPROGENBLOODRESTRICT_GEN_A_NUR
CARDIOLOGY     PROGRESS  NOTE   ________________________________________________    CHIEF COMPLAINT:Patient is a 84y old  Male who presents with a chief complaint of edema (23 Nov 2020 17:06)  no complain.  	  REVIEW OF SYSTEMS:  CONSTITUTIONAL: No fever, weight loss, or fatigue  EYES: No eye pain, visual disturbances, or discharge  ENT:  No difficulty hearing, tinnitus, vertigo; No sinus or throat pain  NECK: No pain or stiffness  RESPIRATORY: No cough, wheezing, chills or hemoptysis; No Shortness of Breath  CARDIOVASCULAR: No chest pain, palpitations, passing out, dizziness, or leg swelling  GASTROINTESTINAL: No abdominal or epigastric pain. No nausea, vomiting, or hematemesis; No diarrhea or constipation. No melena or hematochezia.  GENITOURINARY: No dysuria, frequency, hematuria, or incontinence  NEUROLOGICAL: No headaches, memory loss, loss of strength, numbness, or tremors  SKIN: No itching, burning, rashes, or lesions   LYMPH Nodes: No enlarged glands  ENDOCRINE: No heat or cold intolerance; No hair loss  MUSCULOSKELETAL: No joint pain or swelling; No muscle, back, or extremity pain  PSYCHIATRIC: No depression, anxiety, mood swings, or difficulty sleeping  HEME/LYMPH: No easy bruising, or bleeding gums  ALLERGY AND IMMUNOLOGIC: No hives or eczema	    [ ] All others negative	  [ ] Unable to obtain    PHYSICAL EXAM:  T(C): 36.7 (11-24-20 @ 05:39), Max: 37.2 (11-23-20 @ 20:10)  HR: 66 (11-24-20 @ 05:39) (52 - 66)  BP: 116/73 (11-24-20 @ 05:39) (90/42 - 126/60)  RR: 18 (11-24-20 @ 05:39) (16 - 18)  SpO2: 97% (11-24-20 @ 05:39) (94% - 100%)  Wt(kg): --  I&O's Summary    23 Nov 2020 07:01  -  24 Nov 2020 07:00  --------------------------------------------------------  IN: 240 mL / OUT: 1050 mL / NET: -810 mL        Appearance: Normal	  HEENT:   Normal oral mucosa, PERRL, EOMI	  Lymphatic: No lymphadenopathy  Cardiovascular: Normal S1 S2, No JVD, + murmurs, No edema  Respiratory: Lungs clear to auscultation	  Psychiatry: A & O x 3, Mood & affect appropriate  Gastrointestinal:  Soft, Non-tender, + BS	  Skin: No rashes, No ecchymoses, No cyanosis	  Neurologic: Non-focal  Extremities: Normal range of motion, No clubbing, cyanosis or edema  Vascular: Peripheral pulses palpable 2+ bilaterally    MEDICATIONS  (STANDING):  aspirin enteric coated 81 milliGRAM(s) Oral daily  cefuroxime  IVPB 1500 milliGRAM(s) IV Intermittent once  chlorhexidine 4% Liquid 1 Application(s) Topical two times a day  furosemide    Tablet 40 milliGRAM(s) Oral daily  metoprolol succinate ER 25 milliGRAM(s) Oral daily  minocycline 100 milliGRAM(s) Oral two times a day  multivitamin 1 Tablet(s) Oral daily  pantoprazole    Tablet 40 milliGRAM(s) Oral before breakfast  rosuvastatin 20 milliGRAM(s) Oral at bedtime  spironolactone 25 milliGRAM(s) Oral daily  vancomycin  IVPB 1000 milliGRAM(s) IV Intermittent once      TELEMETRY: 	    ECG:  	  RADIOLOGY:  OTHER: 	  	  LABS:	 	    CARDIAC MARKERS:                                12.8   8.63  )-----------( 174      ( 24 Nov 2020 05:56 )             38.9     11-24    139  |  101  |  29<H>  ----------------------------<  91  4.3   |  28  |  1.11    Ca    9.4      24 Nov 2020 05:56  Phos  4.0     11-24  Mg     2.1     11-24      proBNP: Serum Pro-Brain Natriuretic Peptide: 08307 pg/mL (11-18 @ 12:45)    Lipid Profile: Cholesterol 106  LDL --  HDL 40  TG 73    HgA1c:   TSH: Thyroid Stimulating Hormone, Serum: 1.46 uIU/mL (11-19 @ 09:24)  Thyroid Stimulating Hormone, Serum: 1.51 uIU/mL (11-19 @ 08:41)    PT/INR - ( 23 Nov 2020 17:53 )   PT: 14.7 sec;   INR: 1.24 ratio         PTT - ( 23 Nov 2020 17:53 )  PTT:44.1 sec    < from: Cardiac Cath Lab - Adult (11.23.20 @ 13:07) >  LM:   --  Distal left main: There was a 50 % stenosis.  LAD:   --  Ostial LAD: There was a 95 % stenosis.  CX:   --  Ostial circumflex: There was a 50 % stenosis.  --  OM1: There was a 70 % stenosis.  RCA:   --  RCA: Angiography showed minor luminal irregularities with no  flow limiting lesions.  COMPLICATIONS: There were no complications.  DIAGNOSTIC RECOMMENDATIONS: Severe multivessel disease, involving distal  LM, osital LAD, ostial Cx trifurcation with moderate calcification.  Reccomend CTS evaluation for CABG. Continue ASA, hold P2Y12 inhibitor.  INTERVENTIONAL RECOMMENDATIONS: Severe multivessel disease, involving  distal LM, osital LAD, ostial Cx trifurcation with moderate calcification.  Reccomend CTS evaluation for CABG. Continue ASA, hold P2Y12 inhibitor.  Prepared and signed by    short run of ethan observe    Assessment and plan  ---------------------------  pt is an 85 y/o man with pmhx of htn, hld, hiatal hernia / GERD, OA post Lt TKR x1 year ago presenting with concern for increasing redness of left leg and swelling for 2 months.   Patient reports that he has been having increasing swelling of both of his legs for approximately 2-3 months; however, the left leg has been more swollen and has been getting increasingly more red. He states that he went to his podiatrist when he noted some rash on the lower portion of his left just above his foot, was given a 'cream of some kind, and feels that the redness and swelling got worse after starting the cream.'   He states that he was having chest pain approximately 1 mo ago, went to his PMD and was diagnosed with a hiatal hernia with an xray and prescribed omeprazole.   Patient reports that he has been becoming increasingly more short of breath for approximately 2 months as well. Feels that when he takes just a few steps he has to stop and catch his breath, and is so short of breath that he cannot speak in complete sentences without resting for a period of time first.   pt also states was given amoxicillin a few days ago but only took one dose  He denies any fevers, chills, cough, nausea, vomiting, sweating, chest pain  pt admitted for likely acute CHF with elevated troponin / NSTEMI   chf acute on ?chronic  bradycardia, dc coreg start metoprolol er 25 mg daily  abx as per ID  echo noter/ cta note  cath noted  CABG tomorrow    	         none

## 2021-01-26 NOTE — ED ADULT NURSE NOTE - NS ED NURSE REPORT GIVEN DT
Wound Evaluated By: ZEFERINO MAE
Detail Level: Detailed
Add 13507 Cpt? (Important Note: In 2017 The Use Of 70739 Is Being Tracked By Cms To Determine Future Global Period Reimbursement For Global Periods): no
23-Mar-2018 11:15

## 2021-03-15 NOTE — DISCHARGE NOTE ADULT - SECONDARY DIAGNOSIS.
Goal Outcome Evaluation:  Plan of Care Reviewed With: patient, spouse  Progress: no change  Outcome Summary: Pt admitted with shortness of breath, able to make needds known. Pt currently on simple mask at 9L. Wife at bedside,pt up in chair at this time. no c/o pain voiced at this time, VS stable at this time.   RALF (acute kidney injury) Hypertensive emergency DM (diabetes mellitus) Asthma

## 2021-05-04 NOTE — DISCHARGE NOTE ADULT - CASE MANAGER'S NAME
On asa  
Remote Transmission 5/3/2021:    1-SVT ?? AFL episode~150bpm 4/30/2021@6:11pm lasting 3 mins and 52 secs.    Episode scanned into media.  
Diana Colbert, R.N.  Office no [phone]

## 2022-09-14 NOTE — ED PROVIDER NOTE - INPATIENT RESIDENT/ACP NOTIFIED
Medication history complete. Medications and allergies reviewed with patient and confirmed with . Patient states that he does not take any medications at home. MAR

## 2022-12-15 NOTE — PROGRESS NOTE ADULT - PROVIDER SPECIALTY LIST ADULT
Nephrology Staged Advancement Flap Text: The defect edges were debeveled with a #15 scalpel blade.  Given the location of the defect, shape of the defect and the proximity to free margins a staged advancement flap was deemed most appropriate.  Using a sterile surgical marker, an appropriate advancement flap was drawn incorporating the defect and placing the expected incisions within the relaxed skin tension lines where possible. The area thus outlined was incised deep to adipose tissue with a #15 scalpel blade.  The skin margins were undermined to an appropriate distance in all directions utilizing iris scissors.

## 2023-05-02 NOTE — H&P CARDIOLOGY - HIV STATUS
[facility]    Brief Operative Note    Pre-operative diagnosis:  (ventriculoperitoneal) shunt status [Z98.2]  Post-operative diagnosis Same as pre-operative diagnosis    Procedure: Procedure(s):  REMOVAL, SHUNT, VENTRICULOPERITONEAL,  placement of external venticular drain  Surgeon: Surgeon(s) and Role:     * Arnaud Martínez MD - Primary     * Dhara Lombardo MD - Resident - Assisting  Anesthesia: General   Estimated Blood Loss: 10 mL    Drains: Right frontal EVD  Specimens:   ID Type Source Tests Collected by Time Destination   A : ventricular catheter explant for culture Foreign Body Other AEROBIC BACTERIAL CULTURE ROUTINE Arnaud Martínez MD 4/25/2023  2:11 PM      Findings:   Ventriculoperitoneal shunt removed in its entirety, ventricular catheter soft passed in place, brisk CSF flow. .  Complications: None.  Implants:   Implant Name Type Inv. Item Serial No.  Lot No. LRB No. Used Action   SHUNT CATH VENTRICULAR ANTIBIOTIC-IMPREG LILLI 23CM 43238 - [id number] Shunt SHUNT CATH VENTRICULAR ANTIBIOTIC-IMPREG LILLI 23CM 47702  [employer] [id number] Right 1 Implanted         Dhara Mckee MD  Neurosurgery PGY3    Please contact neurosurgery resident on call with questions.    Dial * * [phone], enter [phone] when prompted.    
   [facility]    Brief Operative Note    Pre-operative diagnosis:  (ventriculoperitoneal) shunt status [Z98.2]  Hydrocephalus, unspecified type (H) [G91.9]  Post-operative diagnosis Same as pre-operative diagnosis    Procedure: Procedure(s):  Stealth Assisted INSERTION, SHUNT, VENTRICULOPERITONEAL, right possible left, explantation of external venticular drain  INSERTION, SHUNT, VENTRICULOPERITONEAL, LAPAROSCOPY-ASSISTED  Surgeon: Surgeon(s) and Role:  Panel 1:     * Arnaud Martínez MD - Primary     * Mello Lane MD - Resident - Assisting  Panel 2:     * Irineo Adame MD - Primary  Anesthesia: General   Estimated Blood Loss: 5cc    Drains: None  Specimens: * No specimens in log *  Findings:   Diffuse dense adhesions throughout peritoneal cavity.  Complications: None.  Implants:   Implant Name Type Inv. Item Serial No.  Lot No. LRB No. Used Action   SHUNT CATH PERITONEAL ANTIBIOTIC-IMPREG LILLI 122CM 91172 - [id number] Shunt SHUNT CATH PERITONEAL ANTIBIOTIC-IMPREG LILLI 122CM 63280  [employer] [id number] N/A 1 Implanted   SHUNT DELTA 2.0  76425 - [id number] Shunt SHUNT DELTA 2.0  [id number]  [employer]-NEURO [id number] N/A 1 Implanted   SHUNT CATH VENTRICULAR ANTIBIOTIC-IMPREG LILLI 23CM 35991 - [id number] Shunt SHUNT CATH VENTRICULAR ANTIBIOTIC-IMPREG LILLI 23CM 47144  [employer] [id number] N/A 1 Implanted       Krissy Austin MD  Surgery PGY-2       
   [facility]    Brief Operative Note    Pre-operative diagnosis:  (ventriculoperitoneal) shunt status [Z98.2]  Hydrocephalus, unspecified type (H) [G91.9]  Post-operative diagnosis Same as pre-operative diagnosis    Procedure: Procedure(s):  Stealth Assisted INSERTION, SHUNT, VENTRICULOPERITONEAL, right possible left, explantation of external venticular drain  INSERTION, SHUNT, VENTRICULOPERITONEAL, LAPAROSCOPY-ASSISTED  Surgeon: Surgeon(s) and Role:  Panel 1:     * Arnaud Martínez MD - Primary     * Mello Lane MD - Resident - Assisting  Panel 2:     * Irineo Adame MD - Primary  Anesthesia: General   Estimated Blood Loss: 20 mL from 5/2/2023  1:26 PM to 5/2/2023  5:18 PM      Drains:  None  Specimens: * No specimens in log *  Findings:   Brisk CSF egress upon entry into ventricle; stealth navigation confirmed positioning   Complications: None.  Implants:   Implant Name Type Inv. Item Serial No.  Lot No. LRB No. Used Action   SHUNT CATH PERITONEAL ANTIBIOTIC-IMPREG LILLI 122CM 71826 - [id number] Shunt SHUNT CATH PERITONEAL ANTIBIOTIC-IMPREG LILLI 122CM 43118  [employer] [id number] N/A 1 Implanted   SHUNT DELTA 2.0  45271 - [id number] Shunt SHUNT DELTA 2.0  [id number]  [employer]-NEURO [id number] N/A 1 Implanted   SHUNT CATH VENTRICULAR ANTIBIOTIC-IMPREG LILLI 23CM 79600 - [id number] Shunt SHUNT CATH VENTRICULAR ANTIBIOTIC-IMPREG LILLI 23CM 45088  [employer] [id number] N/A 1 Implanted     Mello Lane MD, PhD  PGY-2 Neurosurgery  Please page NSGY on call with questions        
Admission Reconciliation is Completed  Discharge Reconciliation is Not Complete
Admission Reconciliation is Completed  Discharge Reconciliation is Completed
Negative/Unknown

## 2025-03-06 ENCOUNTER — APPOINTMENT (OUTPATIENT)
Dept: OTOLARYNGOLOGY | Facility: CLINIC | Age: 50
End: 2025-03-06
Payer: COMMERCIAL

## 2025-03-06 ENCOUNTER — NON-APPOINTMENT (OUTPATIENT)
Age: 50
End: 2025-03-06

## 2025-03-06 VITALS
DIASTOLIC BLOOD PRESSURE: 112 MMHG | OXYGEN SATURATION: 98 % | TEMPERATURE: 97.2 F | WEIGHT: 290 LBS | HEART RATE: 66 BPM | BODY MASS INDEX: 46.61 KG/M2 | SYSTOLIC BLOOD PRESSURE: 174 MMHG | HEIGHT: 66 IN

## 2025-03-06 VITALS — HEART RATE: 71 BPM | DIASTOLIC BLOOD PRESSURE: 23 MMHG | SYSTOLIC BLOOD PRESSURE: 183 MMHG

## 2025-03-06 DIAGNOSIS — Z78.9 OTHER SPECIFIED HEALTH STATUS: ICD-10-CM

## 2025-03-06 DIAGNOSIS — H90.6 MIXED CONDUCTIVE AND SENSORINEURAL HEARING LOSS, BILATERAL: ICD-10-CM

## 2025-03-06 DIAGNOSIS — Z80.9 FAMILY HISTORY OF MALIGNANT NEOPLASM, UNSPECIFIED: ICD-10-CM

## 2025-03-06 DIAGNOSIS — Z82.49 FAMILY HISTORY OF ISCHEMIC HEART DISEASE AND OTHER DISEASES OF THE CIRCULATORY SYSTEM: ICD-10-CM

## 2025-03-06 DIAGNOSIS — Z83.3 FAMILY HISTORY OF DIABETES MELLITUS: ICD-10-CM

## 2025-03-06 DIAGNOSIS — Z86.79 PERSONAL HISTORY OF OTHER DISEASES OF THE CIRCULATORY SYSTEM: ICD-10-CM

## 2025-03-06 DIAGNOSIS — H69.90 UNSPECIFIED EUSTACHIAN TUBE DISORDER, UNSPECIFIED EAR: ICD-10-CM

## 2025-03-06 DIAGNOSIS — H65.23 CHRONIC SEROUS OTITIS MEDIA, BILATERAL: ICD-10-CM

## 2025-03-06 PROCEDURE — 92567 TYMPANOMETRY: CPT

## 2025-03-06 PROCEDURE — 92557 COMPREHENSIVE HEARING TEST: CPT

## 2025-03-06 PROCEDURE — 99204 OFFICE O/P NEW MOD 45 MIN: CPT | Mod: 25

## 2025-03-06 PROCEDURE — 31231 NASAL ENDOSCOPY DX: CPT

## 2025-03-06 RX ORDER — AMOXICILLIN AND CLAVULANATE POTASSIUM 875; 125 MG/1; MG/1
875-125 TABLET, COATED ORAL
Qty: 20 | Refills: 0 | Status: ACTIVE | COMMUNITY
Start: 2025-03-06 | End: 1900-01-01

## 2025-03-06 RX ORDER — ENALAPRIL MALEATE 5 MG/1
TABLET ORAL
Refills: 0 | Status: ACTIVE | COMMUNITY

## 2025-03-06 RX ORDER — FLUTICASONE PROPIONATE 50 UG/1
50 SPRAY, METERED NASAL DAILY
Qty: 3 | Refills: 1 | Status: ACTIVE | COMMUNITY
Start: 2025-03-06 | End: 1900-01-01

## 2025-03-07 PROBLEM — H69.90 ACUTE DYSFUNCTION OF EUSTACHIAN TUBE, UNSPECIFIED LATERALITY: Status: ACTIVE | Noted: 2025-03-07

## 2025-03-07 PROBLEM — H90.6 MIXED CONDUCTIVE AND SENSORINEURAL HEARING LOSS OF BOTH EARS: Status: ACTIVE | Noted: 2025-03-07

## 2025-03-07 PROBLEM — H65.23 CHRONIC SEROUS OTITIS MEDIA OF BOTH EARS: Status: ACTIVE | Noted: 2025-03-07

## 2025-03-26 ENCOUNTER — APPOINTMENT (OUTPATIENT)
Dept: HEART AND VASCULAR | Facility: CLINIC | Age: 50
End: 2025-03-26
Payer: COMMERCIAL

## 2025-03-26 VITALS
OXYGEN SATURATION: 97 % | HEART RATE: 77 BPM | HEIGHT: 66 IN | BODY MASS INDEX: 46.61 KG/M2 | DIASTOLIC BLOOD PRESSURE: 93 MMHG | SYSTOLIC BLOOD PRESSURE: 183 MMHG | WEIGHT: 290 LBS

## 2025-03-26 VITALS — SYSTOLIC BLOOD PRESSURE: 170 MMHG | DIASTOLIC BLOOD PRESSURE: 100 MMHG

## 2025-03-26 DIAGNOSIS — R06.09 OTHER FORMS OF DYSPNEA: ICD-10-CM

## 2025-03-26 DIAGNOSIS — Z87.442 PERSONAL HISTORY OF URINARY CALCULI: ICD-10-CM

## 2025-03-26 DIAGNOSIS — Z78.9 OTHER SPECIFIED HEALTH STATUS: ICD-10-CM

## 2025-03-26 DIAGNOSIS — Z80.0 FAMILY HISTORY OF MALIGNANT NEOPLASM OF DIGESTIVE ORGANS: ICD-10-CM

## 2025-03-26 DIAGNOSIS — I10 ESSENTIAL (PRIMARY) HYPERTENSION: ICD-10-CM

## 2025-03-26 DIAGNOSIS — Z86.79 PERSONAL HISTORY OF OTHER DISEASES OF THE CIRCULATORY SYSTEM: ICD-10-CM

## 2025-03-26 DIAGNOSIS — R73.02 IMPAIRED GLUCOSE TOLERANCE (ORAL): ICD-10-CM

## 2025-03-26 PROCEDURE — 99204 OFFICE O/P NEW MOD 45 MIN: CPT | Mod: 25

## 2025-03-26 PROCEDURE — 93000 ELECTROCARDIOGRAM COMPLETE: CPT

## 2025-03-26 RX ORDER — NEBIVOLOL 10 MG/1
10 TABLET ORAL
Qty: 180 | Refills: 3 | Status: ACTIVE | COMMUNITY
Start: 2025-03-26 | End: 1900-01-01

## 2025-03-26 RX ORDER — TRIAMTERENE AND HYDROCHLOROTHIAZIDE 25; 37.5 MG/1; MG/1
37.5-25 TABLET ORAL
Refills: 0 | Status: ACTIVE | COMMUNITY

## 2025-03-26 RX ORDER — ENALAPRIL MALEATE 20 MG/1
20 TABLET ORAL
Refills: 0 | Status: ACTIVE | COMMUNITY

## 2025-03-26 RX ORDER — NIFEDIPINE 90 MG/1
90 TABLET, EXTENDED RELEASE ORAL DAILY
Qty: 90 | Refills: 3 | Status: ACTIVE | COMMUNITY
Start: 1900-01-01 | End: 1900-01-01

## 2025-03-27 ENCOUNTER — APPOINTMENT (OUTPATIENT)
Dept: OTOLARYNGOLOGY | Facility: CLINIC | Age: 50
End: 2025-03-27
Payer: COMMERCIAL

## 2025-03-27 ENCOUNTER — TRANSCRIPTION ENCOUNTER (OUTPATIENT)
Age: 50
End: 2025-03-27

## 2025-03-27 VITALS
HEART RATE: 86 BPM | TEMPERATURE: 97.3 F | DIASTOLIC BLOOD PRESSURE: 85 MMHG | WEIGHT: 290 LBS | HEIGHT: 66 IN | OXYGEN SATURATION: 97 % | BODY MASS INDEX: 46.61 KG/M2 | SYSTOLIC BLOOD PRESSURE: 147 MMHG

## 2025-03-27 DIAGNOSIS — H90.A32 MIXED CONDUCTIVE AND SENSORINEURAL HEARING, UNILATERAL, LEFT EAR WITH RESTRICTED HEARING ON THE  CONTRALATERAL SIDE: ICD-10-CM

## 2025-03-27 DIAGNOSIS — H65.22 CHRONIC SEROUS OTITIS MEDIA, LEFT EAR: ICD-10-CM

## 2025-03-27 LAB
25(OH)D3 SERPL-MCNC: 9.1 NG/ML
ALBUMIN SERPL ELPH-MCNC: 4.2 G/DL
ALP BLD-CCNC: 110 U/L
ALT SERPL-CCNC: 19 U/L
ANION GAP SERPL CALC-SCNC: 10 MMOL/L
APTT BLD: 29.9 SEC
AST SERPL-CCNC: 22 U/L
BILIRUB SERPL-MCNC: 0.7 MG/DL
BUN SERPL-MCNC: 20 MG/DL
CALCIUM SERPL-MCNC: 9.2 MG/DL
CHLORIDE SERPL-SCNC: 98 MMOL/L
CHOLEST SERPL-MCNC: 151 MG/DL
CO2 SERPL-SCNC: 30 MMOL/L
CREAT SERPL-MCNC: 1.31 MG/DL
EGFRCR SERPLBLD CKD-EPI 2021: 66 ML/MIN/1.73M2
ESTIMATED AVERAGE GLUCOSE: 137 MG/DL
GLUCOSE SERPL-MCNC: 94 MG/DL
HBA1C MFR BLD HPLC: 6.4 %
HCT VFR BLD CALC: 38.6 %
HDLC SERPL-MCNC: 42 MG/DL
HGB BLD-MCNC: 11.7 G/DL
INR PPP: 0.95 RATIO
LDLC SERPL-MCNC: 77 MG/DL
MCHC RBC-ENTMCNC: 25.6 PG
MCHC RBC-ENTMCNC: 30.3 G/DL
MCV RBC AUTO: 84.5 FL
NONHDLC SERPL-MCNC: 110 MG/DL
PLATELET # BLD AUTO: 423 K/UL
POTASSIUM SERPL-SCNC: 4.3 MMOL/L
PROT SERPL-MCNC: 7.3 G/DL
PT BLD: 11.2 SEC
RBC # BLD: 4.57 M/UL
RBC # FLD: 16.4 %
SODIUM SERPL-SCNC: 138 MMOL/L
TRIGL SERPL-MCNC: 193 MG/DL
TSH SERPL-ACNC: 2.35 UIU/ML
WBC # FLD AUTO: 10.71 K/UL

## 2025-03-27 PROCEDURE — 69801 INCISE INNER EAR: CPT | Mod: RT

## 2025-03-27 PROCEDURE — 99214 OFFICE O/P EST MOD 30 MIN: CPT | Mod: 25

## 2025-03-27 RX ORDER — AZITHROMYCIN 250 MG/1
250 TABLET, FILM COATED ORAL
Qty: 1 | Refills: 0 | Status: ACTIVE | COMMUNITY
Start: 2025-03-27 | End: 1900-01-01

## 2025-03-28 PROBLEM — H65.22 CHRONIC SEROUS OTITIS MEDIA OF LEFT EAR: Status: ACTIVE | Noted: 2025-03-28

## 2025-03-29 LAB — APO LP(A) SERPL-MCNC: 10.4 NMOL/L

## 2025-04-02 ENCOUNTER — APPOINTMENT (OUTPATIENT)
Dept: OTOLARYNGOLOGY | Facility: CLINIC | Age: 50
End: 2025-04-02
Payer: COMMERCIAL

## 2025-04-02 VITALS
TEMPERATURE: 97.4 F | OXYGEN SATURATION: 99 % | HEIGHT: 66 IN | SYSTOLIC BLOOD PRESSURE: 144 MMHG | WEIGHT: 290 LBS | BODY MASS INDEX: 46.61 KG/M2 | DIASTOLIC BLOOD PRESSURE: 93 MMHG | HEART RATE: 57 BPM

## 2025-04-02 DIAGNOSIS — H91.21 SUDDEN IDIOPATHIC HEARING LOSS, RIGHT EAR: ICD-10-CM

## 2025-04-02 DIAGNOSIS — H83.01 LABYRINTHITIS, RIGHT EAR: ICD-10-CM

## 2025-04-02 DIAGNOSIS — H90.A31 MIXED CONDUCTIVE AND SENSORINEURAL HEARING LOSS, UNILATERAL, RIGHT EAR WITH RESTRICTED HEARING ON THE  CONTRALATERAL SIDE: ICD-10-CM

## 2025-04-02 PROCEDURE — 92557 COMPREHENSIVE HEARING TEST: CPT

## 2025-04-02 PROCEDURE — 69801 INCISE INNER EAR: CPT | Mod: RT

## 2025-04-02 PROCEDURE — 99213 OFFICE O/P EST LOW 20 MIN: CPT | Mod: 25

## 2025-04-02 PROCEDURE — 92567 TYMPANOMETRY: CPT

## 2025-04-02 RX ORDER — OFLOXACIN OTIC 3 MG/ML
0.3 SOLUTION AURICULAR (OTIC) TWICE DAILY
Qty: 1 | Refills: 0 | Status: ACTIVE | COMMUNITY
Start: 2025-04-02 | End: 1900-01-01

## 2025-04-17 ENCOUNTER — APPOINTMENT (OUTPATIENT)
Dept: OTOLARYNGOLOGY | Facility: CLINIC | Age: 50
End: 2025-04-17
Payer: COMMERCIAL

## 2025-04-17 VITALS
HEART RATE: 57 BPM | DIASTOLIC BLOOD PRESSURE: 90 MMHG | BODY MASS INDEX: 46.61 KG/M2 | SYSTOLIC BLOOD PRESSURE: 145 MMHG | WEIGHT: 290 LBS | HEIGHT: 66 IN | OXYGEN SATURATION: 95 % | TEMPERATURE: 98 F

## 2025-04-17 PROCEDURE — 92557 COMPREHENSIVE HEARING TEST: CPT

## 2025-04-17 PROCEDURE — 69801 INCISE INNER EAR: CPT | Mod: RT

## 2025-04-17 PROCEDURE — 99213 OFFICE O/P EST LOW 20 MIN: CPT | Mod: 25

## 2025-04-17 RX ORDER — OFLOXACIN OTIC 3 MG/ML
0.3 SOLUTION AURICULAR (OTIC)
Qty: 1 | Refills: 1 | Status: ACTIVE | COMMUNITY
Start: 2025-04-17 | End: 1900-01-01

## 2025-04-24 ENCOUNTER — APPOINTMENT (OUTPATIENT)
Dept: OTOLARYNGOLOGY | Facility: CLINIC | Age: 50
End: 2025-04-24
Payer: COMMERCIAL

## 2025-04-24 VITALS
DIASTOLIC BLOOD PRESSURE: 89 MMHG | SYSTOLIC BLOOD PRESSURE: 151 MMHG | BODY MASS INDEX: 46.61 KG/M2 | HEIGHT: 66 IN | TEMPERATURE: 98.1 F | OXYGEN SATURATION: 95 % | HEART RATE: 61 BPM | WEIGHT: 290 LBS

## 2025-04-24 DIAGNOSIS — H90.41 SENSORINEURAL HEARING LOSS, UNILATERAL, RIGHT EAR, WITH UNRESTRICTED HEARING ON THE CONTRALATERAL SIDE: ICD-10-CM

## 2025-04-24 DIAGNOSIS — H91.21 SUDDEN IDIOPATHIC HEARING LOSS, RIGHT EAR: ICD-10-CM

## 2025-04-24 PROCEDURE — 99213 OFFICE O/P EST LOW 20 MIN: CPT

## 2025-04-24 PROCEDURE — 92557 COMPREHENSIVE HEARING TEST: CPT

## 2025-04-25 ENCOUNTER — NON-APPOINTMENT (OUTPATIENT)
Age: 50
End: 2025-04-25

## 2025-04-25 PROBLEM — H90.41 SENSORINEURAL HEARING LOSS (SNHL) OF RIGHT EAR WITH UNRESTRICTED HEARING OF LEFT EAR: Status: ACTIVE | Noted: 2025-04-17

## 2025-04-30 ENCOUNTER — APPOINTMENT (OUTPATIENT)
Dept: HEART AND VASCULAR | Facility: CLINIC | Age: 50
End: 2025-04-30
Payer: COMMERCIAL

## 2025-04-30 VITALS
BODY MASS INDEX: 49.02 KG/M2 | HEIGHT: 66 IN | DIASTOLIC BLOOD PRESSURE: 88 MMHG | HEART RATE: 56 BPM | WEIGHT: 305 LBS | SYSTOLIC BLOOD PRESSURE: 156 MMHG | OXYGEN SATURATION: 97 %

## 2025-04-30 DIAGNOSIS — Z86.39 PERSONAL HISTORY OF OTHER ENDOCRINE, NUTRITIONAL AND METABOLIC DISEASE: ICD-10-CM

## 2025-04-30 DIAGNOSIS — R73.02 IMPAIRED GLUCOSE TOLERANCE (ORAL): ICD-10-CM

## 2025-04-30 DIAGNOSIS — E78.49 OTHER HYPERLIPIDEMIA: ICD-10-CM

## 2025-04-30 DIAGNOSIS — R06.09 OTHER FORMS OF DYSPNEA: ICD-10-CM

## 2025-04-30 DIAGNOSIS — I77.810 THORACIC AORTIC ECTASIA: ICD-10-CM

## 2025-04-30 DIAGNOSIS — I10 ESSENTIAL (PRIMARY) HYPERTENSION: ICD-10-CM

## 2025-04-30 PROCEDURE — ZZZZZ: CPT | Mod: NC

## 2025-04-30 PROCEDURE — 99214 OFFICE O/P EST MOD 30 MIN: CPT

## 2025-04-30 PROCEDURE — 93000 ELECTROCARDIOGRAM COMPLETE: CPT

## 2025-04-30 PROCEDURE — 93306 TTE W/DOPPLER COMPLETE: CPT

## 2025-04-30 RX ORDER — ROSUVASTATIN CALCIUM 20 MG/1
20 TABLET, FILM COATED ORAL
Qty: 90 | Refills: 3 | Status: ACTIVE | COMMUNITY
Start: 2025-04-30 | End: 1900-01-01

## 2025-04-30 RX ORDER — EMPAGLIFLOZIN 25 MG/1
25 TABLET, FILM COATED ORAL DAILY
Qty: 90 | Refills: 3 | Status: ACTIVE | COMMUNITY
Start: 2025-04-30 | End: 1900-01-01

## 2025-05-02 ENCOUNTER — APPOINTMENT (OUTPATIENT)
Dept: CT IMAGING | Facility: CLINIC | Age: 50
End: 2025-05-02
Payer: COMMERCIAL

## 2025-05-02 ENCOUNTER — OUTPATIENT (OUTPATIENT)
Dept: OUTPATIENT SERVICES | Facility: HOSPITAL | Age: 50
LOS: 1 days | End: 2025-05-02

## 2025-05-02 PROCEDURE — 75574 CT ANGIO HRT W/3D IMAGE: CPT | Mod: 26

## 2025-05-22 ENCOUNTER — APPOINTMENT (OUTPATIENT)
Dept: OTOLARYNGOLOGY | Facility: CLINIC | Age: 50
End: 2025-05-22
Payer: COMMERCIAL

## 2025-05-22 VITALS
TEMPERATURE: 98.1 F | DIASTOLIC BLOOD PRESSURE: 90 MMHG | HEIGHT: 66 IN | HEART RATE: 55 BPM | OXYGEN SATURATION: 95 % | SYSTOLIC BLOOD PRESSURE: 137 MMHG | WEIGHT: 305 LBS | BODY MASS INDEX: 49.02 KG/M2

## 2025-05-22 DIAGNOSIS — H69.90 UNSPECIFIED EUSTACHIAN TUBE DISORDER, UNSPECIFIED EAR: ICD-10-CM

## 2025-05-22 DIAGNOSIS — H90.41 SENSORINEURAL HEARING LOSS, UNILATERAL, RIGHT EAR, WITH UNRESTRICTED HEARING ON THE CONTRALATERAL SIDE: ICD-10-CM

## 2025-05-22 PROCEDURE — 92557 COMPREHENSIVE HEARING TEST: CPT

## 2025-05-22 PROCEDURE — 99213 OFFICE O/P EST LOW 20 MIN: CPT

## 2025-05-22 PROCEDURE — 92567 TYMPANOMETRY: CPT

## 2025-06-04 ENCOUNTER — APPOINTMENT (OUTPATIENT)
Dept: HEART AND VASCULAR | Facility: CLINIC | Age: 50
End: 2025-06-04
Payer: COMMERCIAL

## 2025-06-04 VITALS
HEIGHT: 66 IN | HEART RATE: 65 BPM | DIASTOLIC BLOOD PRESSURE: 90 MMHG | SYSTOLIC BLOOD PRESSURE: 150 MMHG | TEMPERATURE: 98 F | BODY MASS INDEX: 49.02 KG/M2 | OXYGEN SATURATION: 95 % | WEIGHT: 305 LBS

## 2025-06-04 VITALS — SYSTOLIC BLOOD PRESSURE: 150 MMHG | DIASTOLIC BLOOD PRESSURE: 100 MMHG

## 2025-06-04 DIAGNOSIS — I25.10 ATHEROSCLEROTIC HEART DISEASE OF NATIVE CORONARY ARTERY W/OUT ANGINA PECTORIS: ICD-10-CM

## 2025-06-04 DIAGNOSIS — I77.810 THORACIC AORTIC ECTASIA: ICD-10-CM

## 2025-06-04 DIAGNOSIS — R73.02 IMPAIRED GLUCOSE TOLERANCE (ORAL): ICD-10-CM

## 2025-06-04 DIAGNOSIS — R06.09 OTHER FORMS OF DYSPNEA: ICD-10-CM

## 2025-06-04 DIAGNOSIS — I10 ESSENTIAL (PRIMARY) HYPERTENSION: ICD-10-CM

## 2025-06-04 DIAGNOSIS — N18.30 CHRONIC KIDNEY DISEASE, STAGE 3 UNSPECIFIED: ICD-10-CM

## 2025-06-04 PROCEDURE — 93000 ELECTROCARDIOGRAM COMPLETE: CPT

## 2025-06-04 PROCEDURE — 99214 OFFICE O/P EST MOD 30 MIN: CPT | Mod: 25

## 2025-06-05 LAB
ALBUMIN SERPL ELPH-MCNC: 4.1 G/DL
ALP BLD-CCNC: 86 U/L
ALT SERPL-CCNC: 27 U/L
ANION GAP SERPL CALC-SCNC: 12 MMOL/L
AST SERPL-CCNC: 27 U/L
BILIRUB SERPL-MCNC: 0.8 MG/DL
BUN SERPL-MCNC: 19 MG/DL
CALCIUM SERPL-MCNC: 9.5 MG/DL
CHLORIDE SERPL-SCNC: 98 MMOL/L
CHOLEST SERPL-MCNC: 103 MG/DL
CO2 SERPL-SCNC: 29 MMOL/L
CREAT SERPL-MCNC: 1.3 MG/DL
EGFRCR SERPLBLD CKD-EPI 2021: 67 ML/MIN/1.73M2
ESTIMATED AVERAGE GLUCOSE: 143 MG/DL
GLUCOSE SERPL-MCNC: 83 MG/DL
HBA1C MFR BLD HPLC: 6.6 %
HDLC SERPL-MCNC: 46 MG/DL
LDLC SERPL-MCNC: 37 MG/DL
NONHDLC SERPL-MCNC: 57 MG/DL
POTASSIUM SERPL-SCNC: 4.4 MMOL/L
PROT SERPL-MCNC: 7 G/DL
SODIUM SERPL-SCNC: 139 MMOL/L
TRIGL SERPL-MCNC: 113 MG/DL

## 2025-06-05 RX ORDER — AMLODIPINE BESYLATE 5 MG/1
5 TABLET ORAL DAILY
Qty: 90 | Refills: 3 | Status: ACTIVE | COMMUNITY
Start: 2025-06-05 | End: 1900-01-01

## 2025-06-25 ENCOUNTER — APPOINTMENT (OUTPATIENT)
Dept: OTOLARYNGOLOGY | Facility: CLINIC | Age: 50
End: 2025-06-25

## 2025-07-07 ENCOUNTER — LABORATORY RESULT (OUTPATIENT)
Age: 50
End: 2025-07-07

## 2025-07-07 ENCOUNTER — APPOINTMENT (OUTPATIENT)
Dept: NEPHROLOGY | Facility: CLINIC | Age: 50
End: 2025-07-07
Payer: COMMERCIAL

## 2025-07-07 VITALS — HEART RATE: 55 BPM | DIASTOLIC BLOOD PRESSURE: 75 MMHG | SYSTOLIC BLOOD PRESSURE: 120 MMHG

## 2025-07-07 PROBLEM — N20.9 UROLITHIASIS: Status: ACTIVE | Noted: 2025-07-07

## 2025-07-07 PROBLEM — E11.69 TYPE 2 DIABETES MELLITUS WITH OTHER SPECIFIED COMPLICATION, UNSPECIFIED WHETHER LONG TERM INSULIN USE: Status: ACTIVE | Noted: 2025-07-07

## 2025-07-07 PROBLEM — E66.9 OBESITY (BMI 30-39.9): Status: ACTIVE | Noted: 2025-07-07

## 2025-07-07 PROBLEM — Z79.899 OTHER LONG TERM (CURRENT) DRUG THERAPY: Status: ACTIVE | Noted: 2025-07-07

## 2025-07-07 PROBLEM — R68.89 OTHER GENERAL SYMPTOMS AND SIGNS: Status: ACTIVE | Noted: 2025-07-07

## 2025-07-07 PROBLEM — G47.33 OBSTRUCTIVE SLEEP APNEA: Status: ACTIVE | Noted: 2025-07-07

## 2025-07-07 PROBLEM — R79.9 ABNORMAL FINDING OF BLOOD CHEMISTRY, UNSPECIFIED: Status: ACTIVE | Noted: 2025-07-07

## 2025-07-07 PROCEDURE — 36415 COLL VENOUS BLD VENIPUNCTURE: CPT

## 2025-07-07 PROCEDURE — 99204 OFFICE O/P NEW MOD 45 MIN: CPT | Mod: 25

## 2025-07-08 LAB
ALBUMIN SERPL ELPH-MCNC: 4.1 G/DL
ALP BLD-CCNC: 81 U/L
ALT SERPL-CCNC: 22 U/L
ANION GAP SERPL CALC-SCNC: 19 MMOL/L
APPEARANCE: CLEAR
AST SERPL-CCNC: 29 U/L
BASOPHILS # BLD AUTO: 0.09 K/UL
BASOPHILS NFR BLD AUTO: 0.9 %
BILIRUB SERPL-MCNC: 0.9 MG/DL
BILIRUBIN URINE: NEGATIVE
BLOOD URINE: NEGATIVE
BUN SERPL-MCNC: 22 MG/DL
CALCIUM SERPL-MCNC: 9.2 MG/DL
CALCIUM SERPL-MCNC: 9.2 MG/DL
CHLORIDE SERPL-SCNC: 98 MMOL/L
CO2 SERPL-SCNC: 24 MMOL/L
COLOR: YELLOW
CREAT SERPL-MCNC: 1.39 MG/DL
CREAT SPEC-SCNC: 102 MG/DL
CYSTATIN C SERPL-MCNC: 1.27 MG/L
EGFRCR SERPLBLD CKD-EPI 2021: 62 ML/MIN/1.73M2
EOSINOPHIL # BLD AUTO: 0.32 K/UL
EOSINOPHIL NFR BLD AUTO: 3.1 %
GFR/BSA.PRED SERPLBLD CYS-BASED-ARV: 59 ML/MIN/1.73M2
GLUCOSE QUALITATIVE U: NEGATIVE MG/DL
GLUCOSE SERPL-MCNC: 76 MG/DL
HCT VFR BLD CALC: 44.6 %
HGB BLD-MCNC: 12.6 G/DL
IMM GRANULOCYTES NFR BLD AUTO: 0.3 %
KETONES URINE: NEGATIVE MG/DL
LEUKOCYTE ESTERASE URINE: NEGATIVE
LYMPHOCYTES # BLD AUTO: 1.24 K/UL
LYMPHOCYTES NFR BLD AUTO: 11.9 %
MAGNESIUM SERPL-MCNC: 2 MG/DL
MAN DIFF?: NORMAL
MCHC RBC-ENTMCNC: 22.7 PG
MCHC RBC-ENTMCNC: 28.3 G/DL
MCV RBC AUTO: 80.4 FL
MICROALBUMIN 24H UR DL<=1MG/L-MCNC: 10.6 MG/DL
MICROALBUMIN/CREAT 24H UR-RTO: 103 MG/G
MONOCYTES # BLD AUTO: 0.58 K/UL
MONOCYTES NFR BLD AUTO: 5.6 %
NEUTROPHILS # BLD AUTO: 8.14 K/UL
NEUTROPHILS NFR BLD AUTO: 78.2 %
NITRITE URINE: NEGATIVE
PARATHYROID HORMONE INTACT: 73 PG/ML
PH URINE: 7
PHOSPHATE SERPL-MCNC: 3.6 MG/DL
PLATELET # BLD AUTO: 344 K/UL
POTASSIUM SERPL-SCNC: 4 MMOL/L
PROT SERPL-MCNC: 7.1 G/DL
PROTEIN URINE: 30 MG/DL
RBC # BLD: 5.55 M/UL
RBC # FLD: 20.1 %
SODIUM SERPL-SCNC: 141 MMOL/L
SPECIFIC GRAVITY URINE: 1.01
UROBILINOGEN URINE: 0.2 MG/DL
WBC # FLD AUTO: 10.4 K/UL

## 2025-07-09 LAB
ALBUMIN MFR SERPL ELPH: 52.2 %
ALBUMIN SERPL-MCNC: 3.8 G/DL
ALBUMIN/GLOB SERPL: 1.1 RATIO
ALBUPE: 39.3 %
ALPHA1 GLOB MFR SERPL ELPH: 4.7 %
ALPHA1 GLOB SERPL ELPH-MCNC: 0.3 G/DL
ALPHA1UPE: 21 %
ALPHA2 GLOB MFR SERPL ELPH: 9.9 %
ALPHA2 GLOB SERPL ELPH-MCNC: 0.7 G/DL
ALPHA2UPE: 13.6 %
B-GLOBULIN MFR SERPL ELPH: 15.2 %
B-GLOBULIN SERPL ELPH-MCNC: 1.1 G/DL
BETAUPE: 12.2 %
DEPRECATED KAPPA LC FREE/LAMBDA SER: 1.43 RATIO
GAMMA GLOB FLD ELPH-MCNC: 1.3 G/DL
GAMMA GLOB MFR SERPL ELPH: 18 %
GAMMAUPE: 13.9 %
IGA 24H UR QL IFE: NORMAL
IGA SERPL-MCNC: 338 MG/DL
IGG SERPL-MCNC: 1289 MG/DL
IGM SERPL-MCNC: 86 MG/DL
INTERPRETATION SERPL IEP-IMP: NORMAL
KAPPA LC 24H UR QL: NORMAL
KAPPA LC CSF-MCNC: 2.76 MG/DL
KAPPA LC SERPL-MCNC: 3.94 MG/DL
M PROTEIN SPEC IFE-MCNC: NORMAL
PROT PATTERN 24H UR ELPH-IMP: NORMAL
PROT SERPL-MCNC: 7.3 G/DL
PROT SERPL-MCNC: 7.3 G/DL
PROT UR-MCNC: 28 MG/DL
PROT UR-MCNC: 28 MG/DL

## 2025-07-21 ENCOUNTER — NON-APPOINTMENT (OUTPATIENT)
Age: 50
End: 2025-07-21

## 2025-08-12 ENCOUNTER — RX RENEWAL (OUTPATIENT)
Age: 50
End: 2025-08-12